# Patient Record
Sex: FEMALE | Race: OTHER | HISPANIC OR LATINO | URBAN - METROPOLITAN AREA
[De-identification: names, ages, dates, MRNs, and addresses within clinical notes are randomized per-mention and may not be internally consistent; named-entity substitution may affect disease eponyms.]

---

## 2021-07-30 ENCOUNTER — INPATIENT (INPATIENT)
Facility: HOSPITAL | Age: 76
LOS: 3 days | Discharge: HOME | DRG: 179 | End: 2021-08-03
Attending: INTERNAL MEDICINE | Admitting: INTERNAL MEDICINE
Payer: MEDICAID

## 2021-07-30 VITALS
WEIGHT: 139.99 LBS | DIASTOLIC BLOOD PRESSURE: 85 MMHG | RESPIRATION RATE: 20 BRPM | SYSTOLIC BLOOD PRESSURE: 159 MMHG | TEMPERATURE: 98 F | OXYGEN SATURATION: 97 % | HEIGHT: 65 IN | HEART RATE: 71 BPM

## 2021-07-30 DIAGNOSIS — R07.9 CHEST PAIN, UNSPECIFIED: ICD-10-CM

## 2021-07-30 LAB
ALBUMIN SERPL ELPH-MCNC: 4.1 G/DL — SIGNIFICANT CHANGE UP (ref 3.3–5)
ALP SERPL-CCNC: 111 U/L — SIGNIFICANT CHANGE UP (ref 40–120)
ALT FLD-CCNC: 19 U/L — SIGNIFICANT CHANGE UP (ref 10–45)
ANION GAP SERPL CALC-SCNC: 9 MMOL/L — SIGNIFICANT CHANGE UP (ref 5–17)
AST SERPL-CCNC: 21 U/L — SIGNIFICANT CHANGE UP (ref 10–40)
BASOPHILS # BLD AUTO: 0.03 K/UL — SIGNIFICANT CHANGE UP (ref 0–0.2)
BASOPHILS NFR BLD AUTO: 0.5 % — SIGNIFICANT CHANGE UP (ref 0–2)
BILIRUB SERPL-MCNC: 0.3 MG/DL — SIGNIFICANT CHANGE UP (ref 0.2–1.2)
BUN SERPL-MCNC: 16 MG/DL — SIGNIFICANT CHANGE UP (ref 7–23)
CALCIUM SERPL-MCNC: 9.5 MG/DL — SIGNIFICANT CHANGE UP (ref 8.4–10.5)
CHLORIDE SERPL-SCNC: 105 MMOL/L — SIGNIFICANT CHANGE UP (ref 96–108)
CO2 SERPL-SCNC: 25 MMOL/L — SIGNIFICANT CHANGE UP (ref 22–31)
CREAT SERPL-MCNC: 0.71 MG/DL — SIGNIFICANT CHANGE UP (ref 0.5–1.3)
D DIMER BLD IA.RAPID-MCNC: <150 NG/ML DDU — SIGNIFICANT CHANGE UP
EOSINOPHIL # BLD AUTO: 0.09 K/UL — SIGNIFICANT CHANGE UP (ref 0–0.5)
EOSINOPHIL NFR BLD AUTO: 1.5 % — SIGNIFICANT CHANGE UP (ref 0–6)
GLUCOSE SERPL-MCNC: 89 MG/DL — SIGNIFICANT CHANGE UP (ref 70–99)
HCT VFR BLD CALC: 41.2 % — SIGNIFICANT CHANGE UP (ref 34.5–45)
HGB BLD-MCNC: 13.4 G/DL — SIGNIFICANT CHANGE UP (ref 11.5–15.5)
IMM GRANULOCYTES NFR BLD AUTO: 0.2 % — SIGNIFICANT CHANGE UP (ref 0–1.5)
LYMPHOCYTES # BLD AUTO: 2.05 K/UL — SIGNIFICANT CHANGE UP (ref 1–3.3)
LYMPHOCYTES # BLD AUTO: 34.5 % — SIGNIFICANT CHANGE UP (ref 13–44)
MCHC RBC-ENTMCNC: 29.2 PG — SIGNIFICANT CHANGE UP (ref 27–34)
MCHC RBC-ENTMCNC: 32.5 GM/DL — SIGNIFICANT CHANGE UP (ref 32–36)
MCV RBC AUTO: 89.8 FL — SIGNIFICANT CHANGE UP (ref 80–100)
MONOCYTES # BLD AUTO: 0.63 K/UL — SIGNIFICANT CHANGE UP (ref 0–0.9)
MONOCYTES NFR BLD AUTO: 10.6 % — SIGNIFICANT CHANGE UP (ref 2–14)
NEUTROPHILS # BLD AUTO: 3.13 K/UL — SIGNIFICANT CHANGE UP (ref 1.8–7.4)
NEUTROPHILS NFR BLD AUTO: 52.7 % — SIGNIFICANT CHANGE UP (ref 43–77)
NRBC # BLD: 0 /100 WBCS — SIGNIFICANT CHANGE UP (ref 0–0)
PLATELET # BLD AUTO: 242 K/UL — SIGNIFICANT CHANGE UP (ref 150–400)
POTASSIUM SERPL-MCNC: 3.9 MMOL/L — SIGNIFICANT CHANGE UP (ref 3.5–5.3)
POTASSIUM SERPL-SCNC: 3.9 MMOL/L — SIGNIFICANT CHANGE UP (ref 3.5–5.3)
PROT SERPL-MCNC: 7.5 G/DL — SIGNIFICANT CHANGE UP (ref 6–8.3)
RBC # BLD: 4.59 M/UL — SIGNIFICANT CHANGE UP (ref 3.8–5.2)
RBC # FLD: 13.2 % — SIGNIFICANT CHANGE UP (ref 10.3–14.5)
SODIUM SERPL-SCNC: 139 MMOL/L — SIGNIFICANT CHANGE UP (ref 135–145)
TROPONIN T, HIGH SENSITIVITY RESULT: 6 NG/L — SIGNIFICANT CHANGE UP (ref 0–51)
TROPONIN T, HIGH SENSITIVITY RESULT: <6 NG/L — SIGNIFICANT CHANGE UP (ref 0–51)
WBC # BLD: 5.94 K/UL — SIGNIFICANT CHANGE UP (ref 3.8–10.5)
WBC # FLD AUTO: 5.94 K/UL — SIGNIFICANT CHANGE UP (ref 3.8–10.5)

## 2021-07-30 PROCEDURE — 93010 ELECTROCARDIOGRAM REPORT: CPT

## 2021-07-30 PROCEDURE — 71046 X-RAY EXAM CHEST 2 VIEWS: CPT | Mod: 26

## 2021-07-30 PROCEDURE — 99285 EMERGENCY DEPT VISIT HI MDM: CPT

## 2021-07-30 RX ORDER — SODIUM CHLORIDE 9 MG/ML
1000 INJECTION INTRAMUSCULAR; INTRAVENOUS; SUBCUTANEOUS ONCE
Refills: 0 | Status: COMPLETED | OUTPATIENT
Start: 2021-07-30 | End: 2021-07-30

## 2021-07-30 RX ORDER — ALBUTEROL 90 UG/1
1.25 AEROSOL, METERED ORAL
Refills: 0 | Status: DISCONTINUED | OUTPATIENT
Start: 2021-07-30 | End: 2021-08-03

## 2021-07-30 RX ORDER — BUDESONIDE, MICRONIZED 100 %
0.25 POWDER (GRAM) MISCELLANEOUS
Refills: 0 | Status: DISCONTINUED | OUTPATIENT
Start: 2021-07-30 | End: 2021-08-03

## 2021-07-30 RX ORDER — IBUPROFEN 200 MG
400 TABLET ORAL ONCE
Refills: 0 | Status: DISCONTINUED | OUTPATIENT
Start: 2021-07-30 | End: 2021-07-30

## 2021-07-30 RX ORDER — AMLODIPINE BESYLATE 2.5 MG/1
5 TABLET ORAL DAILY
Refills: 0 | Status: DISCONTINUED | OUTPATIENT
Start: 2021-07-30 | End: 2021-08-03

## 2021-07-30 RX ORDER — ATORVASTATIN CALCIUM 80 MG/1
40 TABLET, FILM COATED ORAL AT BEDTIME
Refills: 0 | Status: DISCONTINUED | OUTPATIENT
Start: 2021-07-30 | End: 2021-08-03

## 2021-07-30 RX ORDER — ENOXAPARIN SODIUM 100 MG/ML
40 INJECTION SUBCUTANEOUS DAILY
Refills: 0 | Status: DISCONTINUED | OUTPATIENT
Start: 2021-07-30 | End: 2021-08-03

## 2021-07-30 RX ADMIN — AMLODIPINE BESYLATE 5 MILLIGRAM(S): 2.5 TABLET ORAL at 21:54

## 2021-07-30 RX ADMIN — ATORVASTATIN CALCIUM 40 MILLIGRAM(S): 80 TABLET, FILM COATED ORAL at 21:44

## 2021-07-30 RX ADMIN — Medication 0.25 MILLIGRAM(S): at 23:10

## 2021-07-30 RX ADMIN — SODIUM CHLORIDE 1000 MILLILITER(S): 9 INJECTION INTRAMUSCULAR; INTRAVENOUS; SUBCUTANEOUS at 14:54

## 2021-07-30 RX ADMIN — ALBUTEROL 1.25 MILLIGRAM(S): 90 AEROSOL, METERED ORAL at 23:10

## 2021-07-30 NOTE — ED PROVIDER NOTE - NS ED ROS FT
Review of Systems:  -General: no fever   -ENT: no congestion, no difficulty swallowing  -Pulmonary: no cough, +shortness of breath  -Cardiac: +chest pain, no palpitations  -Gastrointestinal: no abdominal pain, no nausea, no vomiting, and no diarrhea.  -Genitourinary: no blood or pain with urination  -Musculoskeletal: no back or neck pain  -Skin: no rashes  -Endocrine: No h/o diabetes or thyroid disease  -Neurologic: No focal weakness or numbness    All else negative unless otherwise specified elsewhere in this note.

## 2021-07-30 NOTE — ED PROVIDER NOTE - PHYSICAL EXAMINATION
On Physical Exam:  General: well appearing, in NAD, speaking clearly in full sentences and without difficulty; cooperative with exam  HEENT: PERRL, MMM  Neck: no neck tenderness, no nuchal rigidity  Cardiac: normal s1, s2; RRR; no MGR  Lungs: CTABL  Abdomen: soft nontender/nondistended  : no bladder tenderness or distension  Skin: intact, no rash  Extremities: no peripheral edema, no gross deformities

## 2021-07-30 NOTE — ED PROVIDER NOTE - ATTENDING CONTRIBUTION TO CARE
Attending Statement (LEROY Bauer MD):   [id number]    HPI: 77y/o F h/o     Review of Systems:  -General: no fever or chills  -ENT: no congestion, no difficulty swallowing  -Pulmonary: no cough, no shortness of breath  -Cardiac: no chest pain, no palpitations  -Gastrointestinal: no abdominal pain, no nausea, no vomiting, and no diarrhea.  -Genitourinary: no blood or pain with urination  -Musculoskeletal: no back or neck pain  -Skin: no rashes  -Endocrine: No h/o diabetes or thyroid disease  -Neurologic: No focal weakness or numbness    All else negative unless otherwise specified elsewhere in this note.    PSH/PMH as noted above    On Physical Exam:  General: well appearing, in NAD, speaking clearly in full sentences and without difficulty; cooperative with exam  HEENT: PERRL, MMM  Neck: no neck tenderness, no nuchal rigidity  Cardiac: normal s1, s2; RRR; no MGR  Lungs: CTABL  Abdomen: soft nontender/nondistended  : no bladder tenderness or distension  Skin: intact, no rash  Extremities: no peripheral edema, no gross deformities  Neuro: no gross neurologic deficits    MDM: Attending Statement (LEROY Bauer MD):   [id number]    HPI: 75y/o F h/o   c/o pain in the "chest and heart" described as warmth and associated with shortness of breath; pain  described as pressure radiating to the left arm to the neck.  Started this morning; states she was laying down and just began out of no - where. States she awoke with pressure/pain and was sweating.  States symptoms lasted about 1 hour.  recently came [place] from [place] weeks ago. no h/o dvt/pe.    PMH:   meds:   allergies:     Review of Systems:  -General: no fever   -ENT: no congestion, no difficulty swallowing  -Pulmonary: no cough, +shortness of breath  -Cardiac: +chest pain, no palpitations  -Gastrointestinal: no abdominal pain, no nausea, no vomiting, and no diarrhea.  -Genitourinary: no blood or pain with urination  -Musculoskeletal: no back or neck pain  -Skin: no rashes  -Endocrine: No h/o diabetes or thyroid disease  -Neurologic: No focal weakness or numbness    All else negative unless otherwise specified elsewhere in this note.    PSH/PMH as noted above    On Physical Exam:  General: well appearing, in NAD, speaking clearly in full sentences and without difficulty; cooperative with exam  HEENT: PERRL, MMM  Neck: no neck tenderness, no nuchal rigidity  Cardiac: normal s1, s2; RRR; no MGR  Lungs: CTABL  Abdomen: soft nontender/nondistended  : no bladder tenderness or distension  Skin: intact, no rash  Extremities: no peripheral edema, no gross deformities  Neuro: no gross neurologic deficits    MDM: Attending Statement (LEROY Bauer MD):   [id number]    HPI: 77y/o F c/o pain in the "chest and heart" described as warmth and associated with shortness of breath; pain  described as pressure radiating to the left arm to the neck.  Started this morning; states she was laying down and just began out of no - where. States she awoke with pressure/pain and was sweating.  States symptoms lasted about 1 hour.  recently came to [place] from [place] few weeks ago. no h/o dvt/pe.  Denies cough/congestion, denies fever/chills, denies n/v/d, denies back pain, denies leg swelling/calf pain. Nonsmoker. No exogenous hormone use.    Review of Systems:  -General: no fever   -ENT: no congestion, no difficulty swallowing  -Pulmonary: no cough, +shortness of breath  -Cardiac: +chest pain, no palpitations  -Gastrointestinal: no abdominal pain, no nausea, no vomiting, and no diarrhea.  -Genitourinary: no blood or pain with urination  -Musculoskeletal: no back or neck pain  -Skin: no rashes  -Endocrine: No h/o diabetes or thyroid disease  -Neurologic: No focal weakness or numbness    All else negative unless otherwise specified elsewhere in this note.    PSH/PMH as noted above    On Physical Exam:  General: well appearing, in NAD, speaking clearly in full sentences and without difficulty; cooperative with exam  HEENT: PERRL, MMM  Neck: no neck tenderness, no nuchal rigidity  Cardiac: normal s1, s2; RRR; no MGR  Lungs: CTABL  Abdomen: soft nontender/nondistended  : no bladder tenderness or distension  Skin: intact, no rash  Extremities: no peripheral edema, no gross deformities    MDM: Pt presenting with chest pain, concerning for ACS - will give aspirin, and obtain ECG, CXR, and labs (CBC/CMP/Cardiac Enzymes). Patient's description of chest pain, including lack of pleuritic nature, minimal risk factors and overall clinical picture are not consistent with a pulmonary embolism; however, given recent travel, will obtain d dimer to further stratify risk (if low value, can exclude PE from differential). The patient's clinical presentation, including type/description of pain, stable vitals and overall clinical picture are not consistent with an acute aortic dissection.  No fever/chills, cough or other symptoms suggestive of an acute infectious etiology.    In ED, remains stable, but given age, description of symptoms; remain concerned for ACS/unstable angina; poor f/u; will plan for admission for further cardiac testing/evaluation.  D dimer neg, no significant anemia, troponin not significantly elevated.

## 2021-07-30 NOTE — H&P ADULT - ASSESSMENT
pt w/ chest pain  r/o acs  trops  echo  stress test  cards eval  pulm eval  pt takes pulm meds  dvt proph

## 2021-07-30 NOTE — H&P ADULT - HISTORY OF PRESENT ILLNESS
77y/o F c/o pain in thechest described as warmth and associated with shortness of breath; pain  described as pressure radiating to the left arm to the neck  .  Started this morning; states she was laying down    States she awoke with pressure/pain and was sweating.    States symptoms lasted about 1 hour.  recently came to [place] from [place] weeks ago. no h/o dvt/pe.    Denies cough/congestion, denies fever/chills, denies n/v/d, denies back pain, denies leg swelling/calf pain. Nonsmoker.

## 2021-07-30 NOTE — ED PROVIDER NOTE - OBJECTIVE STATEMENT
Attending Statement (LEROY Bauer MD):   [id number]    HPI: 75y/o F c/o pain in the "chest and heart" described as warmth and associated with shortness of breath; pain  described as pressure radiating to the left arm to the neck.  Started this morning; states she was laying down and just began out of no - where. States she awoke with pressure/pain and was sweating.  States symptoms lasted about 1 hour.  recently came [place] from [place] weeks ago. no h/o dvt/pe.  Denies cough/congestion, denies fever/chills, denies n/v/d, denies back pain, denies leg swelling/calf pain. Nonsmoker. No exogenous hormone use.

## 2021-07-30 NOTE — ED ADULT TRIAGE NOTE - NS_BHTRGSOURCE_ED_A_ED_FT
Care Due:                  Date            Visit Type   Department     Provider  --------------------------------------------------------------------------------                                CAROLINAT                              VIRTUAL      [facility]  Last Visit: 04-      VISIT        ROSA BURGOS                              ANNUAL                              CHECKUP/PHY  [facility]  Next Visit: 11-      S            ROSA SEVERINO                                                            Last  Test          Frequency    Reason                     Performed    Due Date  --------------------------------------------------------------------------------    HDL.........  12 months..  atorvastatin.............  06- 06-    LDL.........  12 months..  atorvastatin.............  06- 06-    Total         12 months..  atorvastatin.............  06- 06-  Cholesterol.    Triglyceride  12 months..  atorvastatin.............  06- 06-  s...........    Powered by [employer]. Reference number: [id number]. 10/23/2020 6:10:54 PM   CDT   [place]

## 2021-07-30 NOTE — H&P ADULT - NSHPLABSRESULTS_GEN_ALL_CORE
13.4   5.94  )-----------( 242      ( 30 Jul 2021 14:24 )             41.2       07-30    139  |  105  |  16  ----------------------------<  89  3.9   |  25  |  0.71    Ca    9.5      30 Jul 2021 14:24    TPro  7.5  /  Alb  4.1  /  TBili  0.3  /  DBili  x   /  AST  21  /  ALT  19  /  AlkPhos  111  07-30                      Lactate Trend            CAPILLARY BLOOD GLUCOSE

## 2021-07-31 DIAGNOSIS — R07.9 CHEST PAIN, UNSPECIFIED: ICD-10-CM

## 2021-07-31 DIAGNOSIS — R06.02 SHORTNESS OF BREATH: ICD-10-CM

## 2021-07-31 LAB
ANION GAP SERPL CALC-SCNC: 12 MMOL/L — SIGNIFICANT CHANGE UP (ref 5–17)
BUN SERPL-MCNC: 12 MG/DL — SIGNIFICANT CHANGE UP (ref 7–23)
CALCIUM SERPL-MCNC: 9.3 MG/DL — SIGNIFICANT CHANGE UP (ref 8.4–10.5)
CHLORIDE SERPL-SCNC: 103 MMOL/L — SIGNIFICANT CHANGE UP (ref 96–108)
CO2 SERPL-SCNC: 23 MMOL/L — SIGNIFICANT CHANGE UP (ref 22–31)
COVID-19 SPIKE DOMAIN AB INTERP: POSITIVE
COVID-19 SPIKE DOMAIN ANTIBODY RESULT: >250 U/ML — HIGH
CREAT SERPL-MCNC: 0.68 MG/DL — SIGNIFICANT CHANGE UP (ref 0.5–1.3)
GLUCOSE SERPL-MCNC: 72 MG/DL — SIGNIFICANT CHANGE UP (ref 70–99)
HCT VFR BLD CALC: 39.6 % — SIGNIFICANT CHANGE UP (ref 34.5–45)
HCV AB S/CO SERPL IA: 0.14 S/CO — SIGNIFICANT CHANGE UP (ref 0–0.99)
HCV AB SERPL-IMP: SIGNIFICANT CHANGE UP
HGB BLD-MCNC: 12.9 G/DL — SIGNIFICANT CHANGE UP (ref 11.5–15.5)
MCHC RBC-ENTMCNC: 29.4 PG — SIGNIFICANT CHANGE UP (ref 27–34)
MCHC RBC-ENTMCNC: 32.6 GM/DL — SIGNIFICANT CHANGE UP (ref 32–36)
MCV RBC AUTO: 90.2 FL — SIGNIFICANT CHANGE UP (ref 80–100)
NRBC # BLD: 0 /100 WBCS — SIGNIFICANT CHANGE UP (ref 0–0)
PLATELET # BLD AUTO: 230 K/UL — SIGNIFICANT CHANGE UP (ref 150–400)
POTASSIUM SERPL-MCNC: 3.6 MMOL/L — SIGNIFICANT CHANGE UP (ref 3.5–5.3)
POTASSIUM SERPL-SCNC: 3.6 MMOL/L — SIGNIFICANT CHANGE UP (ref 3.5–5.3)
RBC # BLD: 4.39 M/UL — SIGNIFICANT CHANGE UP (ref 3.8–5.2)
RBC # FLD: 13.3 % — SIGNIFICANT CHANGE UP (ref 10.3–14.5)
SARS-COV-2 IGG+IGM SERPL QL IA: >250 U/ML — HIGH
SARS-COV-2 IGG+IGM SERPL QL IA: POSITIVE
SARS-COV-2 RNA SPEC QL NAA+PROBE: SIGNIFICANT CHANGE UP
SODIUM SERPL-SCNC: 138 MMOL/L — SIGNIFICANT CHANGE UP (ref 135–145)
TROPONIN T, HIGH SENSITIVITY RESULT: 6 NG/L — SIGNIFICANT CHANGE UP (ref 0–51)
WBC # BLD: 7.44 K/UL — SIGNIFICANT CHANGE UP (ref 3.8–10.5)
WBC # FLD AUTO: 7.44 K/UL — SIGNIFICANT CHANGE UP (ref 3.8–10.5)

## 2021-07-31 PROCEDURE — 99252 IP/OBS CONSLTJ NEW/EST SF 35: CPT | Mod: GC

## 2021-07-31 RX ORDER — ACETAMINOPHEN 500 MG
650 TABLET ORAL ONCE
Refills: 0 | Status: COMPLETED | OUTPATIENT
Start: 2021-07-31 | End: 2021-07-31

## 2021-07-31 RX ADMIN — ENOXAPARIN SODIUM 40 MILLIGRAM(S): 100 INJECTION SUBCUTANEOUS at 14:50

## 2021-07-31 RX ADMIN — AMLODIPINE BESYLATE 5 MILLIGRAM(S): 2.5 TABLET ORAL at 05:41

## 2021-07-31 RX ADMIN — ATORVASTATIN CALCIUM 40 MILLIGRAM(S): 80 TABLET, FILM COATED ORAL at 22:26

## 2021-07-31 RX ADMIN — ALBUTEROL 1.25 MILLIGRAM(S): 90 AEROSOL, METERED ORAL at 05:47

## 2021-07-31 RX ADMIN — ALBUTEROL 1.25 MILLIGRAM(S): 90 AEROSOL, METERED ORAL at 18:44

## 2021-07-31 RX ADMIN — Medication 0.25 MILLIGRAM(S): at 18:45

## 2021-07-31 RX ADMIN — ALBUTEROL 1.25 MILLIGRAM(S): 90 AEROSOL, METERED ORAL at 23:04

## 2021-07-31 RX ADMIN — Medication 650 MILLIGRAM(S): at 23:20

## 2021-07-31 RX ADMIN — Medication 0.25 MILLIGRAM(S): at 05:47

## 2021-07-31 RX ADMIN — ALBUTEROL 1.25 MILLIGRAM(S): 90 AEROSOL, METERED ORAL at 14:51

## 2021-07-31 NOTE — PROGRESS NOTE ADULT - SUBJECTIVE AND OBJECTIVE BOX
DATE OF SERVICE: 07-31-21 @ 10:38  CHIEF COMPLAINT:Patient is a 76y old  Female who presents with a chief complaint of   	        PAST MEDICAL & SURGICAL HISTORY:  HTN, age 0-18            REVIEW OF SYSTEMS:  no new changes  no cp now      Medications:  MEDICATIONS  (STANDING):  ALBUTerol   0.042% 1.25 milliGRAM(s) Nebulizer four times a day  amLODIPine   Tablet 5 milliGRAM(s) Oral daily  atorvastatin 40 milliGRAM(s) Oral at bedtime  buDESOnide    Inhalation Suspension 0.25 milliGRAM(s) Inhalation two times a day  enoxaparin Injectable 40 milliGRAM(s) SubCutaneous daily    MEDICATIONS  (PRN):    	    PHYSICAL EXAM:  T(C): 36.8 (07-31-21 @ 05:13), Max: 37.3 (07-30-21 @ 20:02)  HR: 69 (07-31-21 @ 05:13) (63 - 71)  BP: 122/76 (07-31-21 @ 05:13) (107/72 - 159/85)  RR: 18 (07-31-21 @ 05:13) (16 - 20)  SpO2: 96% (07-31-21 @ 05:13) (96% - 100%)  Wt(kg): --  I&O's Summary      Appearance: Normal	  	    Cardiovascular: Normal S1 S2, No JVD, No murmurs, No edema  Respiratory: Lungs clear to auscultation	    Gastrointestinal:  Soft, Non-tender, + BS	  	  Neurologic: Non-focal  Extremities: Normal range of motion, No clubbing, cyanosis or edema      TELEMETRY: 	    ECG:  	  RADIOLOGY:  OTHER: 	  	  LABS:	 	    CARDIAC MARKERS:                                12.9   7.44  )-----------( 230      ( 31 Jul 2021 06:56 )             39.6     07-31    138  |  103  |  12  ----------------------------<  72  3.6   |  23  |  0.68    Ca    9.3      31 Jul 2021 06:56    TPro  7.5  /  Alb  4.1  /  TBili  0.3  /  DBili  x   /  AST  21  /  ALT  19  /  AlkPhos  111  07-30    proBNP:   Lipid Profile:   HgA1c:   TSH:

## 2021-07-31 NOTE — PROVIDER CONTACT NOTE (OTHER) - ASSESSMENT
Pt A&Ox4 and states she has SOB and midsternal chest pain along with pressure on her chest. Vitals 98.0 F, Sat O2 99%, HR 66, RR 18, and /76.

## 2021-07-31 NOTE — CONSULT NOTE ADULT - ATTENDING COMMENTS
77 y/o woman with HTN from [facility] here w/ atypical chest pain x 1 day.  --Atypical chest pain, negative troponin so far, and unrevealing electrocardiogram.  --Nevertheless, patient with cardiac risk factors with no reported prior cardiac work-up.  --Check Nuclear stress and echocardiogram.  --check lipid panel and TSH.  --Will follow.

## 2021-07-31 NOTE — PROGRESS NOTE ADULT - SUBJECTIVE AND OBJECTIVE BOX
PULMONARY CONSULT    HPI: 75 y/o with PMH of HTN who presents with chest pain and associated SOB and diaphoresis x1 day. Reportedly on admission pain was described as warmth & pressure with pain radiating down L arm to the neck. Symptoms noted to last 1 hour. Patient recently traveled to  from [place] a few weeks ago. Troponins negative. Pulmonary called to consult on SOB.     PAST MEDICAL & SURGICAL HISTORY:  HTN, age [age]      Allergies  penicillin (Rash)    FAMILY HISTORY:    Social history:     Review of Systems:  CONSTITUTIONAL: No fever, chills, or fatigue  EYES: No eye pain, visual disturbances, or discharge  ENMT:  No difficulty hearing, tinnitus, vertigo; No sinus or throat pain  NECK: No pain or stiffness  RESPIRATORY: Per above  CARDIOVASCULAR: No chest pain, palpitations, dizziness, or leg swelling  GASTROINTESTINAL: No abdominal or epigastric pain. No nausea, vomiting, or hematemesis; No diarrhea or constipation. No melena or hematochezia.  GENITOURINARY: No dysuria, frequency, hematuria, or incontinence  NEUROLOGICAL: No headaches, memory loss, loss of strength, numbness, or tremors  SKIN: No itching, burning, rashes, or lesions   MUSCULOSKELETAL: No joint pain or swelling; No muscle, back, or extremity pain  PSYCHIATRIC: No depression, anxiety, mood swings, or difficulty sleeping    Medications:  MEDICATIONS  (STANDING):  ALBUTerol   0.042% 1.25 milliGRAM(s) Nebulizer four times a day  amLODIPine   Tablet 5 milliGRAM(s) Oral daily  atorvastatin 40 milliGRAM(s) Oral at bedtime  buDESOnide    Inhalation Suspension 0.25 milliGRAM(s) Inhalation two times a day  enoxaparin Injectable 40 milliGRAM(s) SubCutaneous daily      Vital Signs Last 24 Hrs  T(C): 36.8 (31 Jul 2021 05:13), Max: 37.3 (30 Jul 2021 20:02)  T(F): 98.3 (31 Jul 2021 05:13), Max: 99.1 (30 Jul 2021 20:02)  HR: 69 (31 Jul 2021 05:13) (63 - 71)  BP: 122/76 (31 Jul 2021 05:13) (107/72 - 139/60)  BP(mean): --  RR: 18 (31 Jul 2021 05:13) (16 - 20)  SpO2: 96% (31 Jul 2021 05:13) (96% - 100%)      LABS:                        12.9   7.44  )-----------( 230      ( 31 Jul 2021 06:56 )             39.6     07-31    138  |  103  |  12  ----------------------------<  72  3.6   |  23  |  0.68    Ca    9.3      31 Jul 2021 06:56    TPro  7.5  /  Alb  4.1  /  TBili  0.3  /  DBili  x   /  AST  21  /  ALT  19  /  AlkPhos  111  07-30    Physical Examination:    General: No acute distress.      HEENT: Pupils equal, reactive to light.  Symmetric.    PULM:     CVS: S1, S2    ABD: Soft, nondistended, nontender, normoactive bowel sounds, no masses    EXT: No edema, nontender    SKIN: Warm and well perfused, no rashes noted.    NEURO: Alert, oriented, interactive, nonfocal    RADIOLOGY REVIEWED  CXR:     PULMONARY CONSULT    HPI: 77 y/o with PMH of HTN, questionable lung disease, who presents with chest pain and associated SOB and diaphoresis x1 day. Reportedly on admission pain was described as warmth & pressure with pain radiating down L arm to the neck. Symptoms noted to last 1 hour. Patient recently traveled to  from [place] a few weeks ago. Troponins negative. Pulmonary called to consult on SOB. CXR with clear lungs. Patient denies smoking hx but notes exposure to secondhand smoke. Inhaler use at home noted in outpt record. At this time, denies CP, SOB. Breathing comfortably on room air, O2 sats 96%.     PAST MEDICAL & SURGICAL HISTORY:  HTN, age 0-18    Allergies  penicillin (Rash)    FAMILY HISTORY: non contributory     Social history: exposure to secondhand smoke     Review of Systems:  CONSTITUTIONAL: No fever, chills, or fatigue  EYES: No eye pain, visual disturbances, or discharge  ENMT:  No difficulty hearing, tinnitus, vertigo; No sinus or throat pain  NECK: No pain or stiffness  RESPIRATORY: Per above  CARDIOVASCULAR: No chest pain, palpitations, dizziness, or leg swelling  GASTROINTESTINAL: No abdominal or epigastric pain. No nausea, vomiting, or hematemesis; No diarrhea or constipation. No melena or hematochezia.  GENITOURINARY: No dysuria, frequency, hematuria, or incontinence  NEUROLOGICAL: No headaches, memory loss, loss of strength, numbness, or tremors  SKIN: No itching, burning, rashes, or lesions   MUSCULOSKELETAL: No joint pain or swelling; No muscle, back, or extremity pain  PSYCHIATRIC: No depression, anxiety, mood swings, or difficulty sleeping    Medications:  MEDICATIONS  (STANDING):  ALBUTerol   0.042% 1.25 milliGRAM(s) Nebulizer four times a day  amLODIPine   Tablet 5 milliGRAM(s) Oral daily  atorvastatin 40 milliGRAM(s) Oral at bedtime  buDESOnide    Inhalation Suspension 0.25 milliGRAM(s) Inhalation two times a day  enoxaparin Injectable 40 milliGRAM(s) SubCutaneous daily    Vital Signs Last 24 Hrs  T(C): 36.8 (31 Jul 2021 05:13), Max: 37.3 (30 Jul 2021 20:02)  T(F): 98.3 (31 Jul 2021 05:13), Max: 99.1 (30 Jul 2021 20:02)  HR: 69 (31 Jul 2021 05:13) (63 - 71)  BP: 122/76 (31 Jul 2021 05:13) (107/72 - 139/60)  BP(mean): --  RR: 18 (31 Jul 2021 05:13) (16 - 20)  SpO2: 96% (31 Jul 2021 05:13) (96% - 100%)    LABS:                        12.9   7.44  )-----------( 230      ( 31 Jul 2021 06:56 )             39.6     07-31    138  |  103  |  12  ----------------------------<  72  3.6   |  23  |  0.68    Ca    9.3      31 Jul 2021 06:56    TPro  7.5  /  Alb  4.1  /  TBili  0.3  /  DBili  x   /  AST  21  /  ALT  19  /  AlkPhos  111  07-30    Physical Examination:    General: No acute distress.      HEENT: Pupils equal, reactive to light.  Symmetric.    PULM: CTA b/l, no wheezing    CVS: RRR    ABD: Soft, nondistended, nontender, normoactive bowel sounds, no masses    EXT: No edema, nontender    SKIN: Warm and well perfused, no rashes noted.    NEURO: Alert, oriented, interactive, nonfocal    RADIOLOGY REVIEWED  CXR: 7/30 grossly clear lungs

## 2021-07-31 NOTE — PROVIDER CONTACT NOTE (OTHER) - ACTION/TREATMENT ORDERED:
Provider notified and aware, Administer Albuterol 0.042% and Pulmicort Nebulizer treatment as ordered, continue to assess change in pt status

## 2021-07-31 NOTE — CONSULT NOTE ADULT - ASSESSMENT
76F w/ HTN from [place] here w/ atypical chest pain x 1 day. Cardiology called for ischemic eval. Low level of suspicion for ACS given negative trops, lack of symptoms now and lack of ECG changes.    Recs:  - agree with echo and stress testing  - will con't to follow    Uriel Salinas MD  Cardiology Fellow PGY-5  [facility]    Notes are not final until signed by attending  For all consults and questions:  [email or web address]   Login: [email or web address]

## 2021-08-01 LAB
APPEARANCE UR: CLEAR — SIGNIFICANT CHANGE UP
BILIRUB UR-MCNC: NEGATIVE — SIGNIFICANT CHANGE UP
COLOR SPEC: COLORLESS — SIGNIFICANT CHANGE UP
DIFF PNL FLD: ABNORMAL
GLUCOSE UR QL: NEGATIVE — SIGNIFICANT CHANGE UP
HCT VFR BLD CALC: 41.5 % — SIGNIFICANT CHANGE UP (ref 34.5–45)
HGB BLD-MCNC: 13.8 G/DL — SIGNIFICANT CHANGE UP (ref 11.5–15.5)
KETONES UR-MCNC: NEGATIVE — SIGNIFICANT CHANGE UP
LEUKOCYTE ESTERASE UR-ACNC: NEGATIVE — SIGNIFICANT CHANGE UP
MCHC RBC-ENTMCNC: 29.6 PG — SIGNIFICANT CHANGE UP (ref 27–34)
MCHC RBC-ENTMCNC: 33.3 GM/DL — SIGNIFICANT CHANGE UP (ref 32–36)
MCV RBC AUTO: 88.9 FL — SIGNIFICANT CHANGE UP (ref 80–100)
NITRITE UR-MCNC: NEGATIVE — SIGNIFICANT CHANGE UP
NRBC # BLD: 0 /100 WBCS — SIGNIFICANT CHANGE UP (ref 0–0)
PH UR: 6 — SIGNIFICANT CHANGE UP (ref 5–8)
PLATELET # BLD AUTO: 214 K/UL — SIGNIFICANT CHANGE UP (ref 150–400)
PROT UR-MCNC: NEGATIVE — SIGNIFICANT CHANGE UP
RBC # BLD: 4.67 M/UL — SIGNIFICANT CHANGE UP (ref 3.8–5.2)
RBC # FLD: 13.3 % — SIGNIFICANT CHANGE UP (ref 10.3–14.5)
SP GR SPEC: 1.01 — LOW (ref 1.01–1.02)
UROBILINOGEN FLD QL: NEGATIVE — SIGNIFICANT CHANGE UP
WBC # BLD: 9.69 K/UL — SIGNIFICANT CHANGE UP (ref 3.8–10.5)
WBC # FLD AUTO: 9.69 K/UL — SIGNIFICANT CHANGE UP (ref 3.8–10.5)

## 2021-08-01 PROCEDURE — 99232 SBSQ HOSP IP/OBS MODERATE 35: CPT | Mod: GC

## 2021-08-01 PROCEDURE — 93306 TTE W/DOPPLER COMPLETE: CPT | Mod: 26

## 2021-08-01 PROCEDURE — 70450 CT HEAD/BRAIN W/O DYE: CPT | Mod: 26

## 2021-08-01 RX ORDER — ACETAMINOPHEN 500 MG
650 TABLET ORAL EVERY 6 HOURS
Refills: 0 | Status: DISCONTINUED | OUTPATIENT
Start: 2021-08-01 | End: 2021-08-03

## 2021-08-01 RX ORDER — PANTOPRAZOLE SODIUM 20 MG/1
40 TABLET, DELAYED RELEASE ORAL
Refills: 0 | Status: DISCONTINUED | OUTPATIENT
Start: 2021-08-01 | End: 2021-08-03

## 2021-08-01 RX ADMIN — Medication 650 MILLIGRAM(S): at 11:15

## 2021-08-01 RX ADMIN — ALBUTEROL 1.25 MILLIGRAM(S): 90 AEROSOL, METERED ORAL at 23:00

## 2021-08-01 RX ADMIN — Medication 650 MILLIGRAM(S): at 10:30

## 2021-08-01 RX ADMIN — ALBUTEROL 1.25 MILLIGRAM(S): 90 AEROSOL, METERED ORAL at 18:56

## 2021-08-01 RX ADMIN — ALBUTEROL 1.25 MILLIGRAM(S): 90 AEROSOL, METERED ORAL at 11:46

## 2021-08-01 RX ADMIN — PANTOPRAZOLE SODIUM 40 MILLIGRAM(S): 20 TABLET, DELAYED RELEASE ORAL at 10:30

## 2021-08-01 RX ADMIN — Medication 650 MILLIGRAM(S): at 18:55

## 2021-08-01 RX ADMIN — Medication 0.25 MILLIGRAM(S): at 18:56

## 2021-08-01 RX ADMIN — Medication 0.25 MILLIGRAM(S): at 06:09

## 2021-08-01 RX ADMIN — ENOXAPARIN SODIUM 40 MILLIGRAM(S): 100 INJECTION SUBCUTANEOUS at 11:46

## 2021-08-01 RX ADMIN — ATORVASTATIN CALCIUM 40 MILLIGRAM(S): 80 TABLET, FILM COATED ORAL at 21:57

## 2021-08-01 RX ADMIN — AMLODIPINE BESYLATE 5 MILLIGRAM(S): 2.5 TABLET ORAL at 06:09

## 2021-08-01 RX ADMIN — Medication 650 MILLIGRAM(S): at 00:02

## 2021-08-01 RX ADMIN — ALBUTEROL 1.25 MILLIGRAM(S): 90 AEROSOL, METERED ORAL at 06:10

## 2021-08-01 RX ADMIN — Medication 200 MILLIGRAM(S): at 18:56

## 2021-08-01 RX ADMIN — Medication 200 MILLIGRAM(S): at 10:30

## 2021-08-01 NOTE — PROGRESS NOTE ADULT - SUBJECTIVE AND OBJECTIVE BOX
Patient seen and examined at bedside.    Overnight Events:   denies cp or sob   for NST/TTE today    REVIEW OF SYSTEMS:  Constitutional:     [x ] negative [ ] fevers [ ] chills [ ] weight loss [ ] weight gain  HEENT:                  [x ] negative [ ] dry eyes [ ] eye irritation [ ] postnasal drip [ ] nasal congestion  CV:                         [ x] negative  [ ] chest pain [ ] orthopnea [ ] palpitations [ ] murmur  Resp:                     [ x] negative [ ] cough [ ] shortness of breath [ ] dyspnea [ ] wheezing [ ] sputum [ ]hemoptysis  GI:                          [ x] negative [ ] nausea [ ] vomiting [ ] diarrhea [ ] constipation [ ] abd pain [ ] dysphagia   :                        [ x] negative [ ] dysuria [ ] nocturia [ ] hematuria [ ] increased urinary frequency  Musculoskeletal: [ x] negative [ ] back pain [ ] myalgias [ ] arthralgias [ ] fracture  Skin:                       [ x] negative [ ] rash [ ] itch  Neurological:        [x ] negative [ ] headache [ ] dizziness [ ] syncope [ ] weakness [ ] numbness  Psychiatric:           [ x] negative [ ] anxiety [ ] depression  Endocrine:            [ x] negative [ ] diabetes [ ] thyroid problem  Heme/Lymph:      [ x] negative [ ] anemia [ ] bleeding problem  Allergic/Immune: [ x] negative [ ] itchy eyes [ ] nasal discharge [ ] hives [ ] angioedema    [ x] All other systems negative  [ ] Unable to assess ROS due to    Current Meds:  ALBUTerol   0.042% 1.25 milliGRAM(s) Nebulizer four times a day  amLODIPine   Tablet 5 milliGRAM(s) Oral daily  atorvastatin 40 milliGRAM(s) Oral at bedtime  buDESOnide    Inhalation Suspension 0.25 milliGRAM(s) Inhalation two times a day  enoxaparin Injectable 40 milliGRAM(s) SubCutaneous daily      PAST MEDICAL & SURGICAL HISTORY:  HTN, age 0-18        Vitals:  T(F): 98.1 (08-01), Max: 98.1 (08-01)  HR: 70 (08-01) (70 - 84)  BP: 108/65 (08-01) (108/65 - 118/76)  RR: 18 (08-01)  SpO2: 95% (08-01)  I&O's Summary      Physical Exam:  Appearance: No acute distress  HENT: No JVD   Cardiovascular: RRR, S1/S2, no murmurs  Respiratory: CTABL  Gastrointestinal: soft, NT ND, +BS  Musculoskeletal: No clubbing, no edema   Neurologic: Non-focal  Skin: No rashes, ecchymoses, or cyanosis                          12.9   7.44  )-----------( 230      ( 31 Jul 2021 06:56 )             39.6     07-31    138  |  103  |  12  ----------------------------<  72  3.6   |  23  |  0.68    Ca    9.3      31 Jul 2021 06:56    TPro  7.5  /  Alb  4.1  /  TBili  0.3  /  DBili  x   /  AST  21  /  ALT  19  /  AlkPhos  111  07-30                  Cardiovascular Testings:       Interpretation of Telemetry: SR 60s

## 2021-08-01 NOTE — CHART NOTE - NSCHARTNOTEFT_GEN_A_CORE
77y/o F c/o pain in the chest described as warmth and associated with shortness of breath; pain described as pressure radiating to the left arm to the neck. Called by RN for patient with temp of 100.2 orally, patient seen and examined, states HA x 48hrs w/ photophobia, Urinary frequency/feeling of not complete emptying of bladder and cough. Denies blurred vision, chest pain, abdominal pain, nausea and/or vomiting.     Vital Signs Last 24 Hrs  T(C): 37.9 (01 Aug 2021 18:23), Max: 37.9 (01 Aug 2021 18:23)  T(F): 100.2 (01 Aug 2021 18:23), Max: 100.2 (01 Aug 2021 18:23)  HR: 87 (01 Aug 2021 18:23) (70 - 87)  BP: 136/81 (01 Aug 2021 18:23) (108/65 - 136/81)  RR: 18 (01 Aug 2021 18:23) (18 - 18)  SpO2: 98% (01 Aug 2021 18:23) (95% - 98%)                          12.9   7.44  )-----------( 230      ( 31 Jul 2021 06:56 )             39.6   07-31    138  |  103  |  12  ----------------------------<  72  3.6   |  23  |  0.68    Ca    9.3      31 Jul 2021 06:56    HEENT:   Normal oral mucosa, PERRL, EOMI	  Lymphatic: No lymphadenopathy  Cardiovascular: Normal S1 S2, No JVD, No murmurs, No edema  Respiratory: Lungs clear to auscultation	  Psychiatry: A & O x 3, Mood & affect appropriate  Gastrointestinal:  Soft, Non-tender, + BS	  Skin: No rashes, No ecchymoses, No cyanosis	  Neurologic: Non-focal  Extremities: Normal range of motion, No clubbing, cyanosis or edema  Vascular: Peripheral pulses palpable 2+     A/P 77y/o F c/o pain in the chest described as warmth and associated with shortness of breath; pain described as pressure radiating to the left arm to the neck. Called by RN for patient with temp of 100.2 orally, patient seen and examined, states HA x 48hrs w/ photophobia, Urinary frequency/feeling of not complete emptying of bladder and cough. Will r/o respiratory vs  etiology of fever.   - Stat UA   - RVP   - CTH for HA  - CBC/BC  - Bladder scan r/u retention   - Signed out to night team to f/u results, attempted to page Dr. Adams awaiting call back, Night team to continue care.

## 2021-08-01 NOTE — PROGRESS NOTE ADULT - SUBJECTIVE AND OBJECTIVE BOX
DATE OF SERVICE: 08-01-21 @ 13:02  CHIEF COMPLAINT:Patient is a 76y old  Female who presents with a chief complaint of cp (01 Aug 2021 07:39)    	        PAST MEDICAL & SURGICAL HISTORY:  HTN, age 0-18            REVIEW OF SYSTEMS:      NECK: No pain or stiffness  RESPIRATORY: No cough, wheezing, chills or hemoptysis; No Shortness of Breath  CARDIOVASCULAR: No chest pain, this am   GASTROINTESTINAL: No abdominal or epigastric pain.   GENITOURINARY: No dysuria, frequency, hematuria, or incontinence  NEUROLOGICAL: No headaches,    Medications:  MEDICATIONS  (STANDING):  ALBUTerol   0.042% 1.25 milliGRAM(s) Nebulizer four times a day  amLODIPine   Tablet 5 milliGRAM(s) Oral daily  atorvastatin 40 milliGRAM(s) Oral at bedtime  buDESOnide    Inhalation Suspension 0.25 milliGRAM(s) Inhalation two times a day  enoxaparin Injectable 40 milliGRAM(s) SubCutaneous daily  pantoprazole    Tablet 40 milliGRAM(s) Oral before breakfast    MEDICATIONS  (PRN):  acetaminophen   Tablet .. 650 milliGRAM(s) Oral every 6 hours PRN Mild Pain (1 - 3)  aluminum hydroxide/magnesium hydroxide/simethicone Suspension 30 milliLiter(s) Oral every 4 hours PRN Dyspepsia  guaiFENesin Oral Liquid (Sugar-Free) 200 milliGRAM(s) Oral every 6 hours PRN Cough    	    PHYSICAL EXAM:  T(C): 36.7 (08-01-21 @ 04:52), Max: 36.7 (08-01-21 @ 04:52)  HR: 70 (08-01-21 @ 04:52) (70 - 84)  BP: 108/65 (08-01-21 @ 04:52) (108/65 - 118/76)  RR: 18 (08-01-21 @ 04:52) (18 - 18)  SpO2: 95% (08-01-21 @ 04:52) (95% - 97%)  Wt(kg): --  I&O's Summary      Appearance: Normal	  HEENT:   Normal oral mucosa, PERRL, EOMI	  Lymphatic: No lymphadenopathy  Cardiovascular: Normal S1 S2, No JVD, No murmurs, No edema  Respiratory: Lungs clear to auscultation	  Psychiatry: A & O x 3, Mood & affect appropriate  Gastrointestinal:  Soft, Non-tender, + BS	  Skin: No rashes, No ecchymoses, No cyanosis	  Neurologic: Non-focal  Extremities: Normal range of motion, No clubbing, cyanosis or edema  Vascular: Peripheral pulses palpable 2+ bilaterally    TELEMETRY: 	    ECG:  	  RADIOLOGY:  OTHER: 	  	  LABS:	 	    CARDIAC MARKERS:                                12.9   7.44  )-----------( 230      ( 31 Jul 2021 06:56 )             39.6     07-31    138  |  103  |  12  ----------------------------<  72  3.6   |  23  |  0.68    Ca    9.3      31 Jul 2021 06:56    TPro  7.5  /  Alb  4.1  /  TBili  0.3  /  DBili  x   /  AST  21  /  ALT  19  /  AlkPhos  111  07-30    proBNP:   Lipid Profile:   HgA1c:   TSH:

## 2021-08-02 DIAGNOSIS — U07.1 COVID-19: ICD-10-CM

## 2021-08-02 LAB
ALBUMIN SERPL ELPH-MCNC: 4 G/DL — SIGNIFICANT CHANGE UP (ref 3.3–5)
ALP SERPL-CCNC: 130 U/L — HIGH (ref 40–120)
ALT FLD-CCNC: 41 U/L — SIGNIFICANT CHANGE UP (ref 10–45)
AST SERPL-CCNC: 45 U/L — HIGH (ref 10–40)
BILIRUB DIRECT SERPL-MCNC: <0.1 MG/DL — SIGNIFICANT CHANGE UP (ref 0–0.2)
BILIRUB INDIRECT FLD-MCNC: >0.3 MG/DL — SIGNIFICANT CHANGE UP (ref 0.2–1)
BILIRUB SERPL-MCNC: 0.4 MG/DL — SIGNIFICANT CHANGE UP (ref 0.2–1.2)
CREAT SERPL-MCNC: 0.78 MG/DL — SIGNIFICANT CHANGE UP (ref 0.5–1.3)
INR BLD: 1.12 RATIO — SIGNIFICANT CHANGE UP (ref 0.88–1.16)
PROT SERPL-MCNC: 7.6 G/DL — SIGNIFICANT CHANGE UP (ref 6–8.3)
PROTHROM AB SERPL-ACNC: 13.4 SEC — SIGNIFICANT CHANGE UP (ref 10.6–13.6)
RAPID RVP RESULT: DETECTED
RAPID RVP RESULT: DETECTED
SARS-COV-2 RNA SPEC QL NAA+PROBE: DETECTED
SARS-COV-2 RNA SPEC QL NAA+PROBE: DETECTED

## 2021-08-02 PROCEDURE — 93970 EXTREMITY STUDY: CPT | Mod: 26

## 2021-08-02 PROCEDURE — 99222 1ST HOSP IP/OBS MODERATE 55: CPT

## 2021-08-02 RX ORDER — REMDESIVIR 5 MG/ML
100 INJECTION INTRAVENOUS EVERY 24 HOURS
Refills: 0 | Status: DISCONTINUED | OUTPATIENT
Start: 2021-08-03 | End: 2021-08-03

## 2021-08-02 RX ORDER — REMDESIVIR 5 MG/ML
200 INJECTION INTRAVENOUS EVERY 24 HOURS
Refills: 0 | Status: COMPLETED | OUTPATIENT
Start: 2021-08-02 | End: 2021-08-02

## 2021-08-02 RX ORDER — REMDESIVIR 5 MG/ML
INJECTION INTRAVENOUS
Refills: 0 | Status: DISCONTINUED | OUTPATIENT
Start: 2021-08-02 | End: 2021-08-03

## 2021-08-02 RX ADMIN — REMDESIVIR 500 MILLIGRAM(S): 5 INJECTION INTRAVENOUS at 09:40

## 2021-08-02 RX ADMIN — PANTOPRAZOLE SODIUM 40 MILLIGRAM(S): 20 TABLET, DELAYED RELEASE ORAL at 05:33

## 2021-08-02 RX ADMIN — ENOXAPARIN SODIUM 40 MILLIGRAM(S): 100 INJECTION SUBCUTANEOUS at 11:58

## 2021-08-02 RX ADMIN — ALBUTEROL 1.25 MILLIGRAM(S): 90 AEROSOL, METERED ORAL at 17:44

## 2021-08-02 RX ADMIN — Medication 0.25 MILLIGRAM(S): at 17:44

## 2021-08-02 RX ADMIN — ATORVASTATIN CALCIUM 40 MILLIGRAM(S): 80 TABLET, FILM COATED ORAL at 21:11

## 2021-08-02 RX ADMIN — ALBUTEROL 1.25 MILLIGRAM(S): 90 AEROSOL, METERED ORAL at 11:58

## 2021-08-02 RX ADMIN — AMLODIPINE BESYLATE 5 MILLIGRAM(S): 2.5 TABLET ORAL at 05:33

## 2021-08-02 NOTE — CHART NOTE - NSCHARTNOTEFT_GEN_A_CORE
Notified by RN of patient with RVP returning from the lab COVID-19 positive. Additional rapid RVP test sent to confirm diagnosis. Earlier this evening, patient began expressing symptoms of fever to 102.2F, as well as cough. Currently, patient denies symptoms of headache, chills, diaphoresis, chest pain, SOB, abdominal pain, N/V/D. Vital signs are stable and patient is not currently requiring additional oxygen. Patient informed of diagnosis and all questions were answered. Patient expressed understanding. Patients daughter, Ly, called and notified of patients condition as well ( Session ID [id number]). Dr. Adams to be called in the morning to update on patients condition. Patient to be transferred to PICU for further management.    Lexy Méndez PA-C  [facility]   [id number] Notified by RN of patient with RVP returning from the lab COVID-19 positive. Additional rapid RVP test sent to confirm diagnosis. Earlier this evening, patient began expressing symptoms of fever to 102.2F, as well as cough. Currently, patient denies symptoms of headache, chills, diaphoresis, chest pain, SOB, abdominal pain, N/V/D. Vital signs are stable and patient is not currently requiring additional oxygen. Patient informed of diagnosis and all questions were answered. Patient expressed understanding. Patients daughter, Ly, called and notified of patients condition as well ( Session ID [id number]). Dr. Adams called and case discussed. Will start patient on Remdesivir and call for ID consult. Patient to be transferred to PICU for further management.    Lexy Méndez PA-C  [facility]   [id number] Notified by RN of patient with RVP resulting from the lab COVID-19 positive. Additional rapid RVP test sent to confirm diagnosis. Earlier this evening, patient began expressing symptoms of fever to 102.2F, as well as cough. Currently, patient denies symptoms of headache, chills, diaphoresis, chest pain, SOB, abdominal pain, N/V/D. Vital signs are stable and patient is not currently requiring additional oxygen. Patient informed of diagnosis and all questions were answered. Patient expressed understanding. Patients daughter, Ly, called and notified of patients condition as well ( Session ID [id number]). Dr. Adams called and case discussed. Will start patient on Remdesivir and call for ID consult. Patient to be transferred to PICU for further management.    Lexy Méndez PA-C  [facility]   [id number]

## 2021-08-02 NOTE — DISCHARGE NOTE NURSING/CASE MANAGEMENT/SOCIAL WORK - NSDCFUADDAPPT_GEN_ALL_CORE_FT
Follow up with your primary care provider on an outpatient basis post discharge.  Follow up with your primary care provider on an outpatient basis post discharge.     To obtain information about travel guidelines and restrictions, please contact the [place] Department of [facility] at Ph: [phone]

## 2021-08-02 NOTE — PROGRESS NOTE ADULT - ATTENDING COMMENTS
75 y/o woman with HTN from [facility] here w/ atypical chest pain x 1 day.  --Atypical chest pain, negative troponins, and unrevealing electrocardiogram.  --Pulmonary input noted; follow-up LE dopplers.  --Patient complains of headache this AM--Medicine follow-up.  --Patient with cardiac risk factors with no reported prior cardiac work-up.  --Check Nuclear stress and echocardiogram.  --Will follow.
duplex le  agree w above
agree w above

## 2021-08-02 NOTE — DISCHARGE NOTE NURSING/CASE MANAGEMENT/SOCIAL WORK - PATIENT PORTAL LINK FT
You can access the FollowMyHealth Patient Portal offered by [facility] by registering at the following website: [email or web address]. By joining [employer]’s FollowMyHealth portal, you will also be able to view your health information using other applications (apps) compatible with our system.

## 2021-08-02 NOTE — CONSULT NOTE ADULT - SUBJECTIVE AND OBJECTIVE BOX
Patient is a 76y old  Female who presents with a chief complaint of cp (01 Aug 2021 07:39)    HPI:  77y/o F c/o pain in thechest described as warmth and associated with shortness of breath; pain  described as pressure radiating to the left arm to the neck  .  Started this morning; states she was laying down    States she awoke with pressure/pain and was sweating.    States symptoms lasted about 1 hour.  recently came to [place] from [place] weeks ago. no h/o dvt/pe.    Denies cough/congestion, denies fever/chills, denies n/v/d, denies back pain, denies leg swelling/calf pain. Nonsmoker.  (30 Jul 2021 18:50)      PAST MEDICAL & SURGICAL HISTORY:  HTN, age 0-18        Social history:    FAMILY HISTORY:    R        Allergic/Immunologic:	No hives or rash   Allergies    penicillin (Rash)    Intolerances        Antimicrobials:    remdesivir  IVPB   IV Intermittent         Vital Signs Last 24 Hrs  T(C): 37.3 (02 Aug 2021 12:10), Max: 39.3 (01 Aug 2021 20:15)  T(F): 99.1 (02 Aug 2021 12:10), Max: 102.8 (01 Aug 2021 20:15)  HR: 84 (02 Aug 2021 12:10) (77 - 90)  BP: 125/77 (02 Aug 2021 12:10) (103/61 - 136/81)  BP(mean): --  RR: 18 (02 Aug 2021 12:10) (17 - 18)  SpO2: 97% (02 Aug 2021 12:10) (93% - 98%)    PHYSICAL EXAM:Pleasant patient in no acute distress.      Constitutional:Comfortable.Awake and alert  No cachexia     Eyes:PERRL EOMI.NO discharge or conjunctival injection    ENMT:No sinus tenderness.No thrush.No pharyngeal exudate or erythema.Fair dental hygiene    Neck:Supple,No LN,no JVD      Respiratory:Good air entry bilaterally,CTA    Cardiovascular:S1 S2 wnl, No murmurs,rub or gallops    Gastrointestinal:Soft BS(+) no tenderness no masses ,No rebound or guarding    Genitourinary:No CVA tendereness     Rectal:    Extremities:No cyanosis,clubbing or edema.    Vascular:peripheral pulses felt    Neurological:AAO X 3,No grossly focal deficits    Skin:No rash     Lymph Nodes:No palpable LNs    Musculoskeletal:No joint swelling or LOM    Psychiatric:Affect normal.                                13.8   9.69  )-----------( 214      ( 01 Aug 2021 20:34 )             41.5         08-02    x   |  x   |  x   ----------------------------<  x   x    |  x   |  0.78      TPro  7.6  /  Alb  4.0  /  TBili  0.4  /  DBili  <0.1  /  AST  45<H>  /  ALT  41  /  AlkPhos  130<H>  08-02      RECENT CULTURES:      MICROBIOLOGY:          Radiology:      Assessment:        Recommendations and Plan:    Pager [phone]  After 5 pm/weekends or if no response :[phone]      
Patient seen and evaluated at bedside    Reason for consult: ischemic workup    HPI:  76F Croatian-speaking with HTN (came from [facility]) who presented with one day of sharp and dull mid sternal chest pain that woke her up last night. She has not had this pain before. Its worse with palpation, does not get worse with exertion and is currently resolved. Trops neg x 2, ECG NSR. ROS otherwise negative. Cardiology called for ischemic workup.      PMHx:   HTN  PSHx:     Allergies:  penicillin (Rash)      Home Meds:    Current Medications:   ALBUTerol   0.042% 1.25 milliGRAM(s) Nebulizer four times a day  amLODIPine   Tablet 5 milliGRAM(s) Oral daily  atorvastatin 40 milliGRAM(s) Oral at bedtime  buDESOnide    Inhalation Suspension 0.25 milliGRAM(s) Inhalation two times a day  enoxaparin Injectable 40 milliGRAM(s) SubCutaneous daily      FAMILY HISTORY:    Social History: no toxic habits  Smoking History:  Alcohol Use:  Drug Use:    Review of Systems:  REVIEW OF SYSTEMS:  CONSTITUTIONAL: No weakness, fevers or chills  EYES/ENT: No visual changes;  No dysphagia  NECK: No pain or stiffness  RESPIRATORY: No cough, wheezing, hemoptysis; No shortness of breath  CARDIOVASCULAR: No palpitations; No lower extremity edema  GASTROINTESTINAL: No abdominal or epigastric pain. No nausea, vomiting, or hematemesis; No diarrhea or constipation. No melena or hematochezia.  BACK: No back pain  GENITOURINARY: No dysuria, frequency or hematuria  NEUROLOGICAL: No numbness or weakness  SKIN: No itching, burning, rashes, or lesions   All other review of systems is negative unless indicated above.    [x ] All other systems negative  [ ] Unable to assess ROS due to    Physical Exam:  T(F): 98.3 (07-31), Max: 99.1 (07-30)  HR: 69 (07-31) (63 - 71)  BP: 122/76 (07-31) (107/72 - 159/85)  RR: 18 (07-31)  SpO2: 96% (07-31)  GENERAL: No acute distress, well-developed  HEAD:  Atraumatic, Normocephalic  ENT: EOMI, PERRLA, conjunctiva and sclera clear, Neck supple, No JVD, moist mucosa  CHEST/LUNG: Clear to auscultation bilaterally; No wheeze, equal breath sounds bilaterally   BACK: No spinal tenderness  HEART: Regular rate and rhythm; No murmurs, rubs, or gallops  ABDOMEN: Soft, Nontender, Nondistended; Bowel sounds present  EXTREMITIES:  No clubbing, cyanosis, or edema  PSYCH: Nl behavior, nl affect  NEUROLOGY: AAOx3, non-focal, cranial nerves intact  SKIN: Normal color, No rashes or lesions  LINES:        CXR: Personally reviewed    Labs: Personally reviewed                        12.9   7.44  )-----------( 230      ( 31 Jul 2021 06:56 )             39.6     07-31    138  |  103  |  12  ----------------------------<  72  3.6   |  23  |  0.68    Ca    9.3      31 Jul 2021 06:56    TPro  7.5  /  Alb  4.1  /  TBili  0.3  /  DBili  x   /  AST  21  /  ALT  19  /  AlkPhos  111  07-30

## 2021-08-02 NOTE — PROGRESS NOTE ADULT - SUBJECTIVE AND OBJECTIVE BOX
Follow-up Pulm Progress Note    Feeling okay  Denies dyspnea/CP  Sats 97% RA     Medications:  MEDICATIONS  (STANDING):  ALBUTerol   0.042% 1.25 milliGRAM(s) Nebulizer four times a day  amLODIPine   Tablet 5 milliGRAM(s) Oral daily  atorvastatin 40 milliGRAM(s) Oral at bedtime  buDESOnide    Inhalation Suspension 0.25 milliGRAM(s) Inhalation two times a day  enoxaparin Injectable 40 milliGRAM(s) SubCutaneous daily  pantoprazole    Tablet 40 milliGRAM(s) Oral before breakfast  remdesivir  IVPB   IV Intermittent     MEDICATIONS  (PRN):  acetaminophen   Tablet .. 650 milliGRAM(s) Oral every 6 hours PRN Mild Pain (1 - 3)  aluminum hydroxide/magnesium hydroxide/simethicone Suspension 30 milliLiter(s) Oral every 4 hours PRN Dyspepsia  guaiFENesin Oral Liquid (Sugar-Free) 200 milliGRAM(s) Oral every 6 hours PRN Cough          Vital Signs Last 24 Hrs  T(C): 37.3 (02 Aug 2021 12:10), Max: 39.3 (01 Aug 2021 20:15)  T(F): 99.1 (02 Aug 2021 12:10), Max: 102.8 (01 Aug 2021 20:15)  HR: 84 (02 Aug 2021 12:10) (77 - 90)  BP: 125/77 (02 Aug 2021 12:10) (103/61 - 136/81)  BP(mean): --  RR: 18 (02 Aug 2021 12:10) (17 - 18)  SpO2: 97% (02 Aug 2021 12:10) (93% - 98%)           @ 07:01  -  08-02 @ 07:00  --------------------------------------------------------  IN: 720 mL / OUT: 0 mL / NET: 720 mL          LABS:                        13.8   9.69  )-----------( 214      ( 01 Aug 2021 20:34 )             41.5     0802    x   |  x   |  x   ----------------------------<  x   x    |  x   |  0.78      TPro  7.6  /  Alb  4.0  /  TBili  0.4  /  DBili  <0.1  /  AST  45<H>  /  ALT  41  /  AlkPhos  130<H>  08          PT/INR - ( 02 Aug 2021 06:53 )   PT: 13.4 sec;   INR: 1.12 ratio           Urinalysis Basic - ( 01 Aug 2021 23:16 )    Color: Colorless / Appearance: Clear / S.008 / pH: x  Gluc: x / Ketone: Negative  / Bili: Negative / Urobili: Negative   Blood: x / Protein: Negative / Nitrite: Negative   Leuk Esterase: Negative / RBC: 1 /hpf / WBC 2 /HPF   Sq Epi: x / Non Sq Epi: 0 /hpf / Bacteria: Negative        Physical Examination:  PULM: Clear to auscultation bilaterally, no significant sputum production  CVS: S1, S2 heard    RADIOLOGY REVIEWED  CXR:  grossly clear lungs    TTE: < from: Transthoracic Echocardiogram (21 @ 10:45) >  Dimensions:    Normal Values:  LA:     2.9    2.0 - 4.0 cm  Ao:     2.6    2.0 - 3.8 cm  SEPTUM: 0.6    0.6 - 1.2 cm  PWT:    0.6    0.6 -1.1 cm  LVIDd:  4.3    3.0 - 5.6 cm  LVIDs:  2.7    1.8 - 4.0 cm  Derived variables:  LVMI: 43 g/m2  RWT: 0.27  Fractional short: 37 %  EF (Teicholtz): 67 %  ------------------------------------------------------------------------  Observations:  Mitral Valve: Mitral annular calcification, otherwise  normal mitral valve. Minimal mitral regurgitation.  Aortic Valve/Aorta: Aortic valve leaflet morphology not  well visualized.  Normal aortic root size. (Ao: 2.6 cm at the sinuses of  Valsalva).  LeftAtrium: Normal left atrium.  LA volume index = 18  cc/m2.  Left Ventricle: Endocardium not well visualized; grossly  normal left ventricular systolic function. Normal left  ventricular internal dimensions and wall thicknesses. Mild  diastolic dysfunction (Stage I).  Right Heart: Normal right atrium. Normal right ventricular  size and function. Normal tricuspid valve. Minimal  tricuspid regurgitation. Normal pulmonic valve.  Pericardium/Pleura: Normal pericardium with no pericardial  effusion.  Hemodynamic: Estimated right atrial pressure is 8 mm Hg.  Estimated right ventricular systolic pressure equals 38 mm  Hg, assuming right atrial pressure equals 8 mm Hg,  consistent with borderline pulmonary hypertension.  ------------------------------------------------------------------------  Conclusions:  1. Mitral annular calcification, otherwise normal mitral  valve. Minimal mitral regurgitation.  2. Normal left ventricular internal dimensions and wall  thicknesses.  3. Endocardium not well visualized; grossly normal left  ventricular systolic function.  4. Mild diastolic dysfunction (Stage I).  5. Normal right ventricular size and function.  6. Estimated right ventricular systolic pressure equals 38  mm Hg, assuming right atrial pressure equals 8 mm Hg,  consistent with borderline pulmonary hypertension.  ------------------------------------------------------------------------    < end of copied text >

## 2021-08-02 NOTE — PROGRESS NOTE ADULT - SUBJECTIVE AND OBJECTIVE BOX
DATE OF SERVICE: 08-02-21 @ 13:05  CHIEF COMPLAINT:Patient is a 76y old  Female who presents with a chief complaint of covid (02 Aug 2021 12:47)    	        PAST MEDICAL & SURGICAL HISTORY:  HTN, age 0-18            events noted  pt w/ out complaints at present      Medications:  MEDICATIONS  (STANDING):  ALBUTerol   0.042% 1.25 milliGRAM(s) Nebulizer four times a day  amLODIPine   Tablet 5 milliGRAM(s) Oral daily  atorvastatin 40 milliGRAM(s) Oral at bedtime  buDESOnide    Inhalation Suspension 0.25 milliGRAM(s) Inhalation two times a day  enoxaparin Injectable 40 milliGRAM(s) SubCutaneous daily  pantoprazole    Tablet 40 milliGRAM(s) Oral before breakfast  remdesivir  IVPB   IV Intermittent     MEDICATIONS  (PRN):  acetaminophen   Tablet .. 650 milliGRAM(s) Oral every 6 hours PRN Mild Pain (1 - 3)  aluminum hydroxide/magnesium hydroxide/simethicone Suspension 30 milliLiter(s) Oral every 4 hours PRN Dyspepsia  guaiFENesin Oral Liquid (Sugar-Free) 200 milliGRAM(s) Oral every 6 hours PRN Cough    	    PHYSICAL EXAM:  T(C): 37.3 (08-02-21 @ 12:10), Max: 39.3 (08-01-21 @ 20:15)  HR: 84 (08-02-21 @ 12:10) (77 - 90)  BP: 125/77 (08-02-21 @ 12:10) (103/61 - 136/81)  RR: 18 (08-02-21 @ 12:10) (17 - 18)  SpO2: 97% (08-02-21 @ 12:10) (93% - 98%)  Wt(kg): --  I&O's Summary    01 Aug 2021 07:01  -  02 Aug 2021 07:00  --------------------------------------------------------  IN: 720 mL / OUT: 0 mL / NET: 720 mL    02 Aug 2021 07:01  -  02 Aug 2021 13:05  --------------------------------------------------------  IN: 480 mL / OUT: 0 mL / NET: 480 mL        Appearance: Normal	  HEENT:   Normal oral mucosa, PERRL, EOMI	  Lymphatic: No lymphadenopathy  Cardiovascular: Normal S1 S2, No JVD, N  Respiratory: Lungs clear to auscultation	  Psychiatry: A & O   Gastrointestinal:  Soft, Non-tender, + BS	  Skin: No rashes, No ecchymoses, No cyanosis	  Neurologic: Non-focal  Extremities: Normal range of motion, No clubbing, cyanosis or edema      TELEMETRY: 	    ECG:  	  RADIOLOGY:  OTHER: 	  	  LABS:	 	    CARDIAC MARKERS:                                13.8   9.69  )-----------( 214      ( 01 Aug 2021 20:34 )             41.5     08-02    x   |  x   |  x   ----------------------------<  x   x    |  x   |  0.78      TPro  7.6  /  Alb  4.0  /  TBili  0.4  /  DBili  <0.1  /  AST  45<H>  /  ALT  41  /  AlkPhos  130<H>  08-02    proBNP:   Lipid Profile:   HgA1c:   TSH:

## 2021-08-02 NOTE — CONSULT NOTE ADULT - ASSESSMENT
76 year old admitted with covid  not on oxygen  sats are normal no pna on chest xray   non toxic with chest pain and cough  vaccine history not available    no indication for steroids  Suspect we can send home in the am if stable    will dc remdesivr at the time of discharge

## 2021-08-03 VITALS
OXYGEN SATURATION: 96 % | TEMPERATURE: 98 F | RESPIRATION RATE: 17 BRPM | SYSTOLIC BLOOD PRESSURE: 134 MMHG | DIASTOLIC BLOOD PRESSURE: 82 MMHG | HEART RATE: 73 BPM

## 2021-08-03 LAB
ALBUMIN SERPL ELPH-MCNC: 3.9 G/DL — SIGNIFICANT CHANGE UP (ref 3.3–5)
ALP SERPL-CCNC: 138 U/L — HIGH (ref 40–120)
ALT FLD-CCNC: 52 U/L — HIGH (ref 10–45)
AST SERPL-CCNC: 50 U/L — HIGH (ref 10–40)
BILIRUB DIRECT SERPL-MCNC: <0.1 MG/DL — SIGNIFICANT CHANGE UP (ref 0–0.2)
BILIRUB INDIRECT FLD-MCNC: >0.2 MG/DL — SIGNIFICANT CHANGE UP (ref 0.2–1)
BILIRUB SERPL-MCNC: 0.3 MG/DL — SIGNIFICANT CHANGE UP (ref 0.2–1.2)
CREAT SERPL-MCNC: 0.73 MG/DL — SIGNIFICANT CHANGE UP (ref 0.5–1.3)
CRP SERPL-MCNC: 64 MG/L — HIGH (ref 0–4)
D DIMER BLD IA.RAPID-MCNC: 152 NG/ML DDU — SIGNIFICANT CHANGE UP
FERRITIN SERPL-MCNC: 185 NG/ML — HIGH (ref 15–150)
HCT VFR BLD CALC: 40.8 % — SIGNIFICANT CHANGE UP (ref 34.5–45)
HGB BLD-MCNC: 13.2 G/DL — SIGNIFICANT CHANGE UP (ref 11.5–15.5)
INR BLD: 1.12 RATIO — SIGNIFICANT CHANGE UP (ref 0.88–1.16)
LDH SERPL L TO P-CCNC: 178 U/L — SIGNIFICANT CHANGE UP (ref 50–242)
MCHC RBC-ENTMCNC: 29.1 PG — SIGNIFICANT CHANGE UP (ref 27–34)
MCHC RBC-ENTMCNC: 32.4 GM/DL — SIGNIFICANT CHANGE UP (ref 32–36)
MCV RBC AUTO: 90.1 FL — SIGNIFICANT CHANGE UP (ref 80–100)
NRBC # BLD: 0 /100 WBCS — SIGNIFICANT CHANGE UP (ref 0–0)
PLATELET # BLD AUTO: 197 K/UL — SIGNIFICANT CHANGE UP (ref 150–400)
PROT SERPL-MCNC: 7.5 G/DL — SIGNIFICANT CHANGE UP (ref 6–8.3)
PROTHROM AB SERPL-ACNC: 13.4 SEC — SIGNIFICANT CHANGE UP (ref 10.6–13.6)
RBC # BLD: 4.53 M/UL — SIGNIFICANT CHANGE UP (ref 3.8–5.2)
RBC # FLD: 13.7 % — SIGNIFICANT CHANGE UP (ref 10.3–14.5)
WBC # BLD: 5.63 K/UL — SIGNIFICANT CHANGE UP (ref 3.8–10.5)
WBC # FLD AUTO: 5.63 K/UL — SIGNIFICANT CHANGE UP (ref 3.8–10.5)

## 2021-08-03 PROCEDURE — U0003: CPT

## 2021-08-03 PROCEDURE — 93970 EXTREMITY STUDY: CPT

## 2021-08-03 PROCEDURE — 86769 SARS-COV-2 COVID-19 ANTIBODY: CPT

## 2021-08-03 PROCEDURE — 70450 CT HEAD/BRAIN W/O DYE: CPT

## 2021-08-03 PROCEDURE — 85610 PROTHROMBIN TIME: CPT

## 2021-08-03 PROCEDURE — 83615 LACTATE (LD) (LDH) ENZYME: CPT

## 2021-08-03 PROCEDURE — 71046 X-RAY EXAM CHEST 2 VIEWS: CPT

## 2021-08-03 PROCEDURE — 99285 EMERGENCY DEPT VISIT HI MDM: CPT | Mod: 25

## 2021-08-03 PROCEDURE — 80048 BASIC METABOLIC PNL TOTAL CA: CPT

## 2021-08-03 PROCEDURE — 81001 URINALYSIS AUTO W/SCOPE: CPT

## 2021-08-03 PROCEDURE — 93306 TTE W/DOPPLER COMPLETE: CPT

## 2021-08-03 PROCEDURE — 94640 AIRWAY INHALATION TREATMENT: CPT

## 2021-08-03 PROCEDURE — 99232 SBSQ HOSP IP/OBS MODERATE 35: CPT

## 2021-08-03 PROCEDURE — 85025 COMPLETE CBC W/AUTO DIFF WBC: CPT

## 2021-08-03 PROCEDURE — 85027 COMPLETE CBC AUTOMATED: CPT

## 2021-08-03 PROCEDURE — 86803 HEPATITIS C AB TEST: CPT

## 2021-08-03 PROCEDURE — 84484 ASSAY OF TROPONIN QUANT: CPT

## 2021-08-03 PROCEDURE — 82728 ASSAY OF FERRITIN: CPT

## 2021-08-03 PROCEDURE — 86140 C-REACTIVE PROTEIN: CPT

## 2021-08-03 PROCEDURE — 87040 BLOOD CULTURE FOR BACTERIA: CPT

## 2021-08-03 PROCEDURE — 0225U NFCT DS DNA&RNA 21 SARSCOV2: CPT

## 2021-08-03 PROCEDURE — 82565 ASSAY OF CREATININE: CPT

## 2021-08-03 PROCEDURE — 80076 HEPATIC FUNCTION PANEL: CPT

## 2021-08-03 PROCEDURE — U0005: CPT

## 2021-08-03 PROCEDURE — 80053 COMPREHEN METABOLIC PANEL: CPT

## 2021-08-03 PROCEDURE — 85379 FIBRIN DEGRADATION QUANT: CPT

## 2021-08-03 RX ORDER — ACETAMINOPHEN 500 MG
2 TABLET ORAL
Qty: 0 | Refills: 0 | DISCHARGE
Start: 2021-08-03

## 2021-08-03 RX ORDER — ACETAMINOPHEN 500 MG
1 TABLET ORAL
Qty: 0 | Refills: 0 | DISCHARGE

## 2021-08-03 RX ADMIN — ENOXAPARIN SODIUM 40 MILLIGRAM(S): 100 INJECTION SUBCUTANEOUS at 13:19

## 2021-08-03 RX ADMIN — Medication 0.25 MILLIGRAM(S): at 05:57

## 2021-08-03 RX ADMIN — AMLODIPINE BESYLATE 5 MILLIGRAM(S): 2.5 TABLET ORAL at 05:57

## 2021-08-03 RX ADMIN — PANTOPRAZOLE SODIUM 40 MILLIGRAM(S): 20 TABLET, DELAYED RELEASE ORAL at 05:59

## 2021-08-03 RX ADMIN — ALBUTEROL 1.25 MILLIGRAM(S): 90 AEROSOL, METERED ORAL at 05:57

## 2021-08-03 RX ADMIN — ALBUTEROL 1.25 MILLIGRAM(S): 90 AEROSOL, METERED ORAL at 13:18

## 2021-08-03 RX ADMIN — Medication 650 MILLIGRAM(S): at 00:34

## 2021-08-03 RX ADMIN — ALBUTEROL 1.25 MILLIGRAM(S): 90 AEROSOL, METERED ORAL at 00:56

## 2021-08-03 RX ADMIN — Medication 200 MILLIGRAM(S): at 05:58

## 2021-08-03 NOTE — DISCHARGE NOTE PROVIDER - HOSPITAL COURSE
76 year old female, c/o pain in thechest described as warmth and associated with shortness of breath; pain  described as pressure radiating to the left arm to the neck  .  Started this morning; states she was laying down    States she awoke with pressure/pain and was sweating.    States symptoms lasted about 1 hour.  recently came to [place] from [place] few weeks ago. no h/o dvt/pe.    Denies cough/congestion, denies fever/chills, denies n/v/d, denies back pain, denies leg swelling/calf pain. Nonsmoker.    76 year old Montserratian-speaking female, with past medical history of HTN, who is presenting with one day of sharp and dull mid-sternal chest pain that woke her up.  Troponins negative x 2.  ECG shows NSR.  Cardiology consulted for ischemic workup.  CT head negative.  Chest xray shows probable small granulomas in the right midlung with linear scarring or atelectasis in the right perihilar region; linear atelectasis or scarring at the left lung base; no evidence of acute pneumonia.    Transthoracic echocardiogram reveals and EF of 67% and shows   1. Mitral annular calcification, otherwise normal mitral  valve. Minimal mitral regurgitation.  2. Normal left ventricular internal dimensions and wall  thicknesses.  3. Endocardium not well visualized; grossly normal left  ventricular systolic function.  4. Mild diastolic dysfunction (Stage I).  5. Normal right ventricular size and function.  6. Estimated right ventricular systolic pressure equals 38  mm Hg, assuming right atrial pressure equals 8 mm Hg,  consistent with borderline pulmonary hypertension.    Patient noted to be febrile - urinalysis negative; found to be positive for Covid-19.  Started on Remdesivir.  Infectious Disease consulted.  Patient stating 97% on room air.  Remdesivir discontinued.  Patient cleared for discharge, by Dr. Adams, ID, and Cardiology, with PCP and Cardiology follow up.      76 year old Russian-speaking female, with past medical history of HTN, who is presenting with one day of sharp and dull mid-sternal chest pain that woke her up.  Troponins negative x 2.  ECG shows NSR.  Cardiology consulted for ischemic workup.  CT head negative.  Chest xray shows probable small granulomas in the right midlung with linear scarring or atelectasis in the right perihilar region; linear atelectasis or scarring at the left lung base; no evidence of acute pneumonia.    Transthoracic echocardiogram reveals and EF of 67% and shows   1. Mitral annular calcification, otherwise normal mitral  valve. Minimal mitral regurgitation.  2. Normal left ventricular internal dimensions and wall  thicknesses.  3. Endocardium not well visualized; grossly normal left  ventricular systolic function.  4. Mild diastolic dysfunction (Stage I).  5. Normal right ventricular size and function.  6. Estimated right ventricular systolic pressure equals 38  mm Hg, assuming right atrial pressure equals 8 mm Hg,  consistent with borderline pulmonary hypertension.    Patient noted to be febrile - urinalysis negative; found to be positive for Covid-19.  Started on Remdesivir.  Infectious Disease consulted.  Bilateral lower extremity dopplers negative for DVT.  Patient stating 97% on room air.  Remdesivir discontinued.  Patient cleared for discharge, by Dr. Adams, ID, and Cardiology, with PCP and Cardiology follow up.      76 year old [place] Verdean-speaking female, with past medical history of HTN, who is presenting with one day of sharp and dull mid-sternal chest pain that woke her up.  Troponins negative x 2.  ECG shows NSR.  Cardiology consulted for ischemic workup.  CT head negative.  Chest xray shows probable small granulomas in the right midlung with linear scarring or atelectasis in the right perihilar region; linear atelectasis or scarring at the left lung base; no evidence of acute pneumonia.    Transthoracic echocardiogram reveals and EF of 67% and shows   1. Mitral annular calcification, otherwise normal mitral  valve. Minimal mitral regurgitation.  2. Normal left ventricular internal dimensions and wall  thicknesses.  3. Endocardium not well visualized; grossly normal left  ventricular systolic function.  4. Mild diastolic dysfunction (Stage I).  5. Normal right ventricular size and function.  6. Estimated right ventricular systolic pressure equals 38  mm Hg, assuming right atrial pressure equals 8 mm Hg,  consistent with borderline pulmonary hypertension.    Patient noted to be febrile - urinalysis negative; found to be positive for Covid-19.  Started on Remdesivir.  Infectious Disease consulted.  Bilateral lower extremity dopplers negative for DVT.  Patient stating 97% on room air.  Remdesivir discontinued.  Patient cleared for discharge, by Dr. Adams, ID, and Cardiology, with PCP, Cardiology, and Pulmonary follow up.

## 2021-08-03 NOTE — DISCHARGE NOTE PROVIDER - NSDCMRMEDTOKEN_GEN_ALL_CORE_FT
acetaminophen 325 mg oral tablet: 2 tab(s) orally every 8 hours, As Needed - for mild pain   amlodipino 5mg tablet: 1 tab(s) orally once a day (in the evening)  beclometasona Dipropionata 50mcg inhaler: 2 puff(s) inhaled 2 times a day  Bromuro de Ipratropio 20mcg inhaler: 2 puff(s) inhaled 2 times a day  Diosmina/Hesperida 450mg/5mg tablet: 1 tab(s) orally 2 times a day  rosuvina 40mg tablet: 1 tab(s) orally once a day  Salbutamol 100mcg inhaler: 2 puff(s) inhaled 2 times a day

## 2021-08-03 NOTE — DISCHARGE NOTE PROVIDER - NSDCCPCAREPLAN_GEN_ALL_CORE_FT
PRINCIPAL DISCHARGE DIAGNOSIS  Diagnosis: Chest pain  Assessment and Plan of Treatment: Resolved  You will need to follow up with your cardiologist within one week of discharge - please call to make an appointment.      SECONDARY DISCHARGE DIAGNOSES  Diagnosis: COVID-19  Assessment and Plan of Treatment: You tested positive for Covid-19 on August 1, 2021.  You must quarantine for 10 days.  You will need to follow up with your primary medical doctor within one week of discharge - please call to make an appointment.  You no longer require hospitalization.  Please restrict activities outside of your home except for getting medical care.  Do not go to work, school, or public areas.  Avoid using public transportation, ride-sharing, or taxis.  Separate yourself from other people and animals in your home.  Call ahead before visiting your doctor.  Wear a facemask when you are around other people. Cover your cough and sneezes.  Clean your hands often.  Avoid sharing personal household items.  Clean all frequently touched surfaces daily.       PRINCIPAL DISCHARGE DIAGNOSIS  Diagnosis: Chest pain  Assessment and Plan of Treatment: Resolved  You will need to follow up with your cardiologist within 1-2 weeks of discharge - please call to make an appointment.      SECONDARY DISCHARGE DIAGNOSES  Diagnosis: COVID-19  Assessment and Plan of Treatment: You tested positive for Covid-19 on August 1, 2021.  You must quarantine for 10 days.  You will need to follow up with your primary medical doctor within one week of discharge - please call to make an appointment.  You no longer require hospitalization.  Please restrict activities outside of your home except for getting medical care.  Do not go to work, school, or public areas.  Avoid using public transportation, ride-sharing, or taxis.  Separate yourself from other people and animals in your home.  Call ahead before visiting your doctor.  Wear a facemask when you are around other people. Cover your cough and sneezes.  Clean your hands often.  Avoid sharing personal household items.  Clean all frequently touched surfaces daily.      Diagnosis: Shortness of breath  Assessment and Plan of Treatment: Improved  You will need to follow up with your pulmonologist within 1-2 weeks of discharge - please call to make an appointment.     PRINCIPAL DISCHARGE DIAGNOSIS  Diagnosis: Chest pain  Assessment and Plan of Treatment: Resolved  You will need to follow up with your cardiologist within 1-2 weeks of discharge - please call to make an appointment.      SECONDARY DISCHARGE DIAGNOSES  Diagnosis: COVID-19  Assessment and Plan of Treatment: You tested positive for Covid-19 on August 1, 2021.  You must quarantine for 10 days.  You will need to follow up with your primary medical doctor within one week of discharge - please call to make an appointment.  You no longer require hospitalization.  Please restrict activities outside of your home except for getting medical care.  Do not go to work, school, or public areas.  Avoid using public transportation, ride-sharing, or taxis.  Separate yourself from other people and animals in your home.  Call ahead before visiting your doctor.  Wear a facemask when you are around other people. Cover your cough and sneezes.  Clean your hands often.  Avoid sharing personal household items.  Clean all frequently touched surfaces daily.      Diagnosis: Shortness of breath  Assessment and Plan of Treatment: Improved  You will need to follow up with your pulmonologist within 1-2 weeks of discharge - please call to make an appointment.    Diagnosis: Abnormal liver function test  Assessment and Plan of Treatment: You LFTs were noted to be elevated - likely due to your covid-19 infection.  You will need to follow up with your primary medical doctor to have your LFTs repeated, when your infection clears, due ensure that they return to normal.

## 2021-08-03 NOTE — DISCHARGE NOTE PROVIDER - NSDCFUADDAPPT_GEN_ALL_CORE_FT
Follow up with your primary care provider on an outpatient basis post discharge.     To obtain information about travel guidelines and restrictions, please contact the [place] [facility] of [facility] at Ph: [phone] Follow up with your primary care provider and your cardiologist within one week of discharge - please call to make these appointments.     To obtain information about travel guidelines and restrictions, please contact the [place] [facility] of [facility] at Ph: [phone] Follow up with your primary care provider within one week of discharge - please call to make this appointment.     Follow up with your pulmonologist and cardiologist within 1-2 weeks of discharge - please call to make these appointments.    To obtain information about travel guidelines and restrictions, please contact the [place] [facility] of [facility] at Ph: [phone]

## 2021-08-03 NOTE — PROGRESS NOTE ADULT - SUBJECTIVE AND OBJECTIVE BOX
infectious diseases progress note:    Patient is a 76y old  Female who presents with a chief complaint of covid (02 Aug 2021 12:47)        Chest pain               Allergies    penicillin (Rash)    Intolerances        ANTIBIOTICS/RELEVANT:  antimicrobials  remdesivir  IVPB   IV Intermittent   remdesivir  IVPB 100 milliGRAM(s) IV Intermittent every 24 hours    immunologic:    OTHER:  acetaminophen   Tablet .. 650 milliGRAM(s) Oral every 6 hours PRN  ALBUTerol   0.042% 1.25 milliGRAM(s) Nebulizer four times a day  aluminum hydroxide/magnesium hydroxide/simethicone Suspension 30 milliLiter(s) Oral every 4 hours PRN  amLODIPine   Tablet 5 milliGRAM(s) Oral daily  atorvastatin 40 milliGRAM(s) Oral at bedtime  buDESOnide    Inhalation Suspension 0.25 milliGRAM(s) Inhalation two times a day  enoxaparin Injectable 40 milliGRAM(s) SubCutaneous daily  guaiFENesin Oral Liquid (Sugar-Free) 200 milliGRAM(s) Oral every 6 hours PRN  pantoprazole    Tablet 40 milliGRAM(s) Oral before breakfast      Objective:  Vital Signs Last 24 Hrs  T(C): 36.7 (03 Aug 2021 08:30), Max: 37.4 (02 Aug 2021 16:20)  T(F): 98 (03 Aug 2021 08:30), Max: 99.3 (02 Aug 2021 16:20)  HR: 73 (03 Aug 2021 08:30) (73 - 89)  BP: 134/82 (03 Aug 2021 08:30) (104/63 - 134/82)  BP(mean): --  RR: 17 (03 Aug 2021 08:30) (17 - 18)  SpO2: 96% (03 Aug 2021 08:30) (96% - 97%)       Eyes:KENDALL, EOMI  Ear/Nose/Throat: no oral lesion, no sinus tenderness on percussion	  Neck:no JVD, no lymphadenopathy, supple  Respiratory: CTA sunny  Cardiovascular: S1S2 RRR, no murmurs  Gastrointestinal:soft, (+) BS, no HSM  Extremities:no e/e/c        LABS:                        13.2   5.63  )-----------( 197      ( 03 Aug 2021 08:23 )             40.8     08-02    x   |  x   |  x   ----------------------------<  x   x    |  x   |  0.78      TPro  7.6  /  Alb  4.0  /  TBili  0.4  /  DBili  <0.1  /  AST  45<H>  /  ALT  41  /  AlkPhos  130<H>      PT/INR - ( 02 Aug 2021 06:53 )   PT: 13.4 sec;   INR: 1.12 ratio           Urinalysis Basic - ( 01 Aug 2021 23:16 )    Color: Colorless / Appearance: Clear / S.008 / pH: x  Gluc: x / Ketone: Negative  / Bili: Negative / Urobili: Negative   Blood: x / Protein: Negative / Nitrite: Negative   Leuk Esterase: Negative / RBC: 1 /hpf / WBC 2 /HPF   Sq Epi: x / Non Sq Epi: 0 /hpf / Bacteria: Negative          MICROBIOLOGY:    RECENT CULTURES:   @ 21:48 .Blood Blood                No growth to date.          RESPIRATORY CULTURES:              RADIOLOGY & ADDITIONAL STUDIES:        Pager [phone]  After 5 pm/weekends or if no response :[phone]

## 2021-08-03 NOTE — PROGRESS NOTE ADULT - SUBJECTIVE AND OBJECTIVE BOX
DATE OF SERVICE: 08-03-21 @ 10:36  CHIEF COMPLAINT:Patient is a 76y old  Female who presents with a chief complaint of covid (03 Aug 2021 08:43)    	        PAST MEDICAL & SURGICAL HISTORY:  HTN, age [age]            REVIEW OF SYSTEMS:  no new complaints      Medications:  MEDICATIONS  (STANDING):  ALBUTerol   0.042% 1.25 milliGRAM(s) Nebulizer four times a day  amLODIPine   Tablet 5 milliGRAM(s) Oral daily  atorvastatin 40 milliGRAM(s) Oral at bedtime  buDESOnide    Inhalation Suspension 0.25 milliGRAM(s) Inhalation two times a day  enoxaparin Injectable 40 milliGRAM(s) SubCutaneous daily  pantoprazole    Tablet 40 milliGRAM(s) Oral before breakfast    MEDICATIONS  (PRN):  acetaminophen   Tablet .. 650 milliGRAM(s) Oral every 6 hours PRN Mild Pain (1 - 3)  aluminum hydroxide/magnesium hydroxide/simethicone Suspension 30 milliLiter(s) Oral every 4 hours PRN Dyspepsia  guaiFENesin Oral Liquid (Sugar-Free) 200 milliGRAM(s) Oral every 6 hours PRN Cough    	    PHYSICAL EXAM:  T(C): 36.7 (08-03-21 @ 08:30), Max: 37.4 (08-02-21 @ 16:20)  HR: 73 (08-03-21 @ 08:30) (73 - 89)  BP: 134/82 (08-03-21 @ 08:30) (104/63 - 134/82)  RR: 17 (08-03-21 @ 08:30) (17 - 18)  SpO2: 96% (08-03-21 @ 08:30) (96% - 97%)  Wt(kg): --  I&O's Summary    02 Aug 2021 07:01  -  03 Aug 2021 07:00  --------------------------------------------------------  IN: 720 mL / OUT: 0 mL / NET: 720 mL        Appearance: Normal	  HEENT:   Normal oral mucosa, PERRL, EOMI	  Lymphatic: No lymphadenopathy  Cardiovascular: Normal S1 S2, No JVD, No murmurs, No edema  Respiratory: Lungs clear to auscultation	  Gastrointestinal:  Soft, Non-tender, + BS	  Skin: No rashes, No ecchymoses, No cyanosis	  Neurologic: Non-focal  Extremities: Normal range of motion, No clubbing, cyanosis or edema  Vascular: Peripheral pulses palpable 2+ bilaterally    TELEMETRY: 	    ECG:  	  RADIOLOGY:  OTHER: 	  	  LABS:	 	    CARDIAC MARKERS:                                13.2   5.63  )-----------( 197      ( 03 Aug 2021 08:23 )             40.8     08-03    x   |  x   |  x   ----------------------------<  x   x    |  x   |  0.73      TPro  7.5  /  Alb  3.9  /  TBili  0.3  /  DBili  <0.1  /  AST  50<H>  /  ALT  52<H>  /  AlkPhos  138<H>  08-03    proBNP:   Lipid Profile:   HgA1c:   TSH:

## 2021-08-03 NOTE — PROGRESS NOTE ADULT - SUBJECTIVE AND OBJECTIVE BOX
Follow-up Pulm Progress Note    No new respiratory events overnight.  Denies SOB/CP.     Medications:  MEDICATIONS  (STANDING):  ALBUTerol   0.042% 1.25 milliGRAM(s) Nebulizer four times a day  amLODIPine   Tablet 5 milliGRAM(s) Oral daily  atorvastatin 40 milliGRAM(s) Oral at bedtime  buDESOnide    Inhalation Suspension 0.25 milliGRAM(s) Inhalation two times a day  enoxaparin Injectable 40 milliGRAM(s) SubCutaneous daily  pantoprazole    Tablet 40 milliGRAM(s) Oral before breakfast    MEDICATIONS  (PRN):  acetaminophen   Tablet .. 650 milliGRAM(s) Oral every 6 hours PRN Mild Pain (1 - 3)  aluminum hydroxide/magnesium hydroxide/simethicone Suspension 30 milliLiter(s) Oral every 4 hours PRN Dyspepsia  guaiFENesin Oral Liquid (Sugar-Free) 200 milliGRAM(s) Oral every 6 hours PRN Cough          Vital Signs Last 24 Hrs  T(C): 36.7 (03 Aug 2021 08:30), Max: 37.4 (02 Aug 2021 16:20)  T(F): 98 (03 Aug 2021 08:30), Max: 99.3 (02 Aug 2021 16:20)  HR: 73 (03 Aug 2021 08:30) (73 - 89)  BP: 134/82 (03 Aug 2021 08:30) (104/63 - 134/82)  BP(mean): --  RR: 17 (03 Aug 2021 08:30) (17 - 18)  SpO2: 96% (03 Aug 2021 08:30) (96% - 97%)           @ 07:01  -  08-03 @ 07:00  --------------------------------------------------------  IN: 720 mL / OUT: 0 mL / NET: 720 mL          LABS:                        13.2   5.63  )-----------( 197      ( 03 Aug 2021 08:23 )             40.8     08-03    x   |  x   |  x   ----------------------------<  x   x    |  x   |  0.73      TPro  7.5  /  Alb  3.9  /  TBili  0.3  /  DBili  <0.1  /  AST  50<H>  /  ALT  52<H>  /  AlkPhos  138<H>  08          CAPILLARY BLOOD GLUCOSE        PT/INR - ( 03 Aug 2021 08:23 )   PT: 13.4 sec;   INR: 1.12 ratio           Urinalysis Basic - ( 01 Aug 2021 23:16 )    Color: Colorless / Appearance: Clear / S.008 / pH: x  Gluc: x / Ketone: Negative  / Bili: Negative / Urobili: Negative   Blood: x / Protein: Negative / Nitrite: Negative   Leuk Esterase: Negative / RBC: 1 /hpf / WBC 2 /HPF   Sq Epi: x / Non Sq Epi: 0 /hpf / Bacteria: Negative                      CULTURES:     Culture - Blood (collected 21 @ 21:48)  Source: .Blood Blood  Preliminary Report (21 @ 22:02):    No growth to date.    Culture - Blood (collected 21 @ 21:48)  Source: .Blood Blood  Preliminary Report (21 @ 22:02):    No growth to date.        Physical Examination:  PULM: Clear to auscultation bilaterally, no significant sputum production  CVS: S1, S2 heard    RADIOLOGY REVIEWED  CXR:  grossly clear lungs    TTE: < from: Transthoracic Echocardiogram (21 @ 10:45) >  Dimensions:    Normal Values:  LA:     2.9    2.0 - 4.0 cm  Ao:     2.6    2.0 - 3.8 cm  SEPTUM: 0.6    0.6 - 1.2 cm  PWT:    0.6    0.6 -1.1 cm  LVIDd:  4.3    3.0 - 5.6 cm  LVIDs:  2.7    1.8 - 4.0 cm  Derived variables:  LVMI: 43 g/m2  RWT: 0.27  Fractional short: 37 %  EF (Teicholtz): 67 %  ------------------------------------------------------------------------  Observations:  Mitral Valve: Mitral annular calcification, otherwise  normal mitral valve. Minimal mitral regurgitation.  Aortic Valve/Aorta: Aortic valve leaflet morphology not  well visualized.  Normal aortic root size. (Ao: 2.6 cm at the sinuses of  Valsalva).  LeftAtrium: Normal left atrium.  LA volume index = 18  cc/m2.  Left Ventricle: Endocardium not well visualized; grossly  normal left ventricular systolic function. Normal left  ventricular internal dimensions and wall thicknesses. Mild  diastolic dysfunction (Stage I).  Right Heart: Normal right atrium. Normal right ventricular  size and function. Normal tricuspid valve. Minimal  tricuspid regurgitation. Normal pulmonic valve.  Pericardium/Pleura: Normal pericardium with no pericardial  effusion.  Hemodynamic: Estimated right atrial pressure is 8 mm Hg.  Estimated right ventricular systolic pressure equals 38 mm  Hg, assuming right atrial pressure equals 8 mm Hg,  consistent with borderline pulmonary hypertension.  ------------------------------------------------------------------------  Conclusions:  1. Mitral annular calcification, otherwise normal mitral  valve. Minimal mitral regurgitation.  2. Normal left ventricular internal dimensions and wall  thicknesses.  3. Endocardium not well visualized; grossly normal left  ventricular systolic function.  4. Mild diastolic dysfunction (Stage I).  5. Normal right ventricular size and function.  6. Estimated right ventricular systolic pressure equals 38  mm Hg, assuming right atrial pressure equals 8 mm Hg,  consistent with borderline pulmonary hypertension.  ------------------------------------------------------------------------    < end of copied text >

## 2021-08-03 NOTE — PROGRESS NOTE ADULT - ASSESSMENT
75 y/o with PMH of HTN, questionable lung disease, who presents with chest pain and associated SOB and diaphoresis x1 day. Reportedly on admission pain was described as warmth & pressure with pain radiating down L arm to the neck. Symptoms noted to last 1 hour. [place] recently [place] from [place] a few weeks ago. Troponins negative. Pulmonary called to consult on SOB. CXR with clear lungs. +COVID.    
76F w/ HTN from [place] here w/ atypical chest pain x 1 day. Cardiology called for ischemic eval. Low level of suspicion for ACS given negative trops, lack of symptoms now and lack of ECG changes.    1, Atypical chest pain     -bp and HR at goal   -for TTE/NST today   -f/u FLP, a1c, and TFT   -monitor on telemetry and maintain goal K~4.5 and Mg~2.5    Thank you for allowing us to participate in the care of your patient. If you have any questions or concerns please do not hesitate to contact us 24/7.   All Cardiology service information can be found 24/7 on [email or web address], password: [email or web address] Villalobos MD  PGY-5 Cardiology Fellow, Cj/JONNY  
pt w/ chest pain  r/o acs  trops neg  echo/noted  stress test today  cards eval noted  pulm f/u  id eval noted  covid  remdesivir as per id   c/w meds  dvt proph  
77 y/o with PMH of HTN, questionable lung disease, who presents with chest pain and associated SOB and diaphoresis x1 day. Reportedly on admission pain was described as warmth & pressure with pain radiating down L arm to the neck. Symptoms noted to last 1 hour. [place] recently [place] from [place] a few weeks ago. Troponins negative. Pulmonary called to consult on SOB. CXR with clear lungs. O2 sats 96%.    
pt w/ chest pain  r/o acs  trops neg  echo  stress test  cards eval  pulm eval  pt takes pulm meds  dvt proph  
pt w/ chest pain  r/o acs  trops neg  echo/noted  cards to f/u    pulm f/u  id eval noted  covid  c/w meds  dvt proph  cleared for d/c as per id  cards f/u   d/c if ok w/ cards  
pt w/ chest pain  r/o acs  trops neg  echo/stress  stress test today  cards eval noted  pulm eval noted  c/w meds  dvt proph  
76 year old admitted with covid  not on oxygen  sats are normal no pna on chest xray   non toxic with chest pain and cough  vaccine history not available    no indication for steroids    we can send home  from [facility]   will dc remdesivr    defer need for AC therapy at home to pulmonary 
75 y/o with PMH of HTN, questionable lung disease, who presents with chest pain and associated SOB and diaphoresis x1 day. Reportedly on admission pain was described as warmth & pressure with pain radiating down L arm to the neck. Symptoms noted to last 1 hour. [place] from [place] few weeks ago. Troponins negative. Pulmonary called to consult on SOB. CXR with clear lungs. O2 sats 96%.

## 2021-08-03 NOTE — PROGRESS NOTE ADULT - PROVIDER SPECIALTY LIST ADULT
Internal Medicine
Cardiology
Pulmonology
Cardiology
Internal Medicine
Internal Medicine
Infectious Disease
Internal Medicine
Pulmonology
Pulmonology

## 2021-08-03 NOTE — CHART NOTE - NSCHARTNOTEFT_GEN_A_CORE
Request from Dr. Adams to facilitate patient discharge.  Medication reconciliation reviewed, revised, and resolved with Dr. Adams, who has medically cleared patient for discharge with follow up as advised.  Please refer to discharge note for detailed hospital course.

## 2021-08-03 NOTE — DISCHARGE NOTE PROVIDER - PROVIDER TOKENS
PROVIDER:[TOKEN:[[id number]]] PROVIDER:[TOKEN:[[id number]]],PROVIDER:[TOKEN:[[id number]]],PROVIDER:[TOKEN:[[id number]]]

## 2021-08-03 NOTE — PROGRESS NOTE ADULT - PROBLEM SELECTOR PLAN 1
Questionable underlying lung disease due to exposure to secondhand smoke (pt notes taking inhalers at home) vs cardiac in nature   -No wheezing on exam at this time, SOB appears to have resolved  -CXR with clear lungs  -Can continue with Albuterol nebs & Pulmicort 0.25 mg inhaled BID  -F/u NST  -F/u TTE  -Eventual outpt PFTs   -Normoxic
negative PCR on admission then with 2 rapid RVP's +COVID  -Remdesivir per ID (monitor creatinine, LFTs)  -Sats remain high 90s. No indication for decadron at this time.  -Keep sats >90% with supplemental O2 PRN  -ID f/u
negative PCR on admission then with 2 rapid RVP's +COVID  -Normoxic, no c/o dyspnea at this time   -Sats remain high 90s. No indication for decadron at this time.  -ID recs noted  -D/c planning per primary team. Ddimer normal, LE duplex negative DVT, no indication for ppx AC on discharge.

## 2021-08-03 NOTE — DISCHARGE NOTE PROVIDER - CARE PROVIDER_API CALL
AMRIK MARTINEZ  Internal Medicine  [address] [address]  Phone: ([phone]  Fax: ()-  Follow Up Time:    AMRIK MARTINEZ  Internal Medicine  [address] [address]  Phone: ()-  Fax: ()-  Follow Up Time:     Helio Jaquez)  Critical Care Medicine  [address]  [place] [address]  Phone: [phone]  Fax: [phone]  Follow Up Time:     Ahmet Kenny)  Cardiology; Internal Medicine  [address] [place] [address]  Phone: [phone]  Fax: [phone]  Follow Up Time:

## 2021-08-03 NOTE — PROGRESS NOTE ADULT - SUBJECTIVE AND OBJECTIVE BOX
SUBJ:    Home Medications:  acetaminophen 500 mg oral tablet: 1 to 2 tab(s) orally , As Needed (30 Jul 2021 17:44)  amlodipino 5mg tablet: 1 tab(s) orally once a day (in the evening) (30 Jul 2021 17:44)  beclometasona Dipropionata 50mcg inhaler: 2 puff(s) inhaled 2 times a day (30 Jul 2021 17:44)  Bromuro de Ipratropio 20mcg inhaler: 2 puff(s) inhaled 2 times a day (30 Jul 2021 17:44)  Diosmina/Hesperida 450mg/5mg tablet: 1 tab(s) orally 2 times a day (30 Jul 2021 17:44)  rosuvina 40mg tablet: 1 tab(s) orally once a day (30 Jul 2021 17:44)  Salbutamol 100mcg inhaler: 2 puff(s) inhaled 2 times a day (30 Jul 2021 17:44)      MEDICATIONS  (STANDING):  ALBUTerol   0.042% 1.25 milliGRAM(s) Nebulizer four times a day  amLODIPine   Tablet 5 milliGRAM(s) Oral daily  atorvastatin 40 milliGRAM(s) Oral at bedtime  buDESOnide    Inhalation Suspension 0.25 milliGRAM(s) Inhalation two times a day  enoxaparin Injectable 40 milliGRAM(s) SubCutaneous daily  pantoprazole    Tablet 40 milliGRAM(s) Oral before breakfast    MEDICATIONS  (PRN):  acetaminophen   Tablet .. 650 milliGRAM(s) Oral every 6 hours PRN Mild Pain (1 - 3)  aluminum hydroxide/magnesium hydroxide/simethicone Suspension 30 milliLiter(s) Oral every 4 hours PRN Dyspepsia  guaiFENesin Oral Liquid (Sugar-Free) 200 milliGRAM(s) Oral every 6 hours PRN Cough      Vital Signs Last 24 Hrs  T(C): 36.7 (03 Aug 2021 08:30), Max: 37.4 (02 Aug 2021 16:20)  T(F): 98 (03 Aug 2021 08:30), Max: 99.3 (02 Aug 2021 16:20)  HR: 73 (03 Aug 2021 08:30) (73 - 89)  BP: 134/82 (03 Aug 2021 08:30) (104/63 - 134/82)  BP(mean): --  RR: 17 (03 Aug 2021 08:30) (17 - 18)  SpO2: 96% (03 Aug 2021 08:30) (96% - 97%)    REVIEW OF SYSTEMS:  As per HPI, otherwise unremarkable.     PHYSICAL EXAM:  Constitutional/Appearance: Normal, Well-developed  HEENT:   Normal oral mucosa, no drainage or redness, supple neck  Lymphatic: No lymphadenopathy  Cardiovascular: Normal S1 S2, No edema, RRR  Respiratory: Lungs clear to auscultation, respirations non-labored  Psychiatry: A & O x 3, appropriate affect.   Gastrointestinal:  Soft, Non-tender, no distention  Skin: No rashes, No ecchymoses, No cyanosis	  Neurologic: Non-focal, Alert and oriented x 3  Extremities: Normal range of motion  Vascular: Peripheral pulses palpable 2+ bilaterally (radial)    TELEMETRY:    ECG:    TTE:    LABS:  CBC Full  -  ( 03 Aug 2021 08:23 )  WBC Count : 5.63 K/uL  RBC Count : 4.53 M/uL  Hemoglobin : 13.2 g/dL  Hematocrit : 40.8 %  Platelet Count - Automated : 197 K/uL  Mean Cell Volume : 90.1 fl  Mean Cell Hemoglobin : 29.1 pg  Mean Cell Hemoglobin Concentration : 32.4 gm/dL    08-03    x   |  x   |  x   ----------------------------<  x   x    |  x   |  0.73      TPro  7.5  /  Alb  3.9  /  TBili  0.3  /  DBili  <0.1  /  AST  50<H>  /  ALT  52<H>  /  AlkPhos  138<H>  08-03          RADIOLOGY & ADDITIONAL STUDIES:    IMPRESSION AND PLAN:        Mellisa Pak MD, MPH, AGUSTIN, RPVI, FACC  Inpatient Cardiovascular Specialist; [facility]  ; [employer] at \[facility] [place] [facility]""/[facility]  c: [phone] (text preferred and/or call)  e: [email or web address]    For all [facility] Cardiology and Cardiovascular Surgery on-service contact/call information, go to [email or web address] and use "[email or web address]" to login.  For outpatient Cardiology appointments, call  [phone] to arrange with a colleague; I do not have outpatient Cardiology clinic.    SUBJ:  Elevated temperature, chest congestion, cough. Found to be COVID positive and placed in isolation; started on remdesivir while hospitalized to be discontinued upon discharge. ECHO with preserved EF and no WMA. Undetectable cardiac enzymes.     Home Medications:  acetaminophen 500 mg oral tablet: 1 to 2 tab(s) orally , As Needed (30 Jul 2021 17:44)  amlodipino 5mg tablet: 1 tab(s) orally once a day (in the evening) (30 Jul 2021 17:44)  beclometasona Dipropionata 50mcg inhaler: 2 puff(s) inhaled 2 times a day (30 Jul 2021 17:44)  Bromuro de Ipratropio 20mcg inhaler: 2 puff(s) inhaled 2 times a day (30 Jul 2021 17:44)  Diosmina/Hesperida 450mg/5mg tablet: 1 tab(s) orally 2 times a day (30 Jul 2021 17:44)  rosuvina 40mg tablet: 1 tab(s) orally once a day (30 Jul 2021 17:44)  Salbutamol 100mcg inhaler: 2 puff(s) inhaled 2 times a day (30 Jul 2021 17:44)    MEDICATIONS  (STANDING):  ALBUTerol   0.042% 1.25 milliGRAM(s) Nebulizer four times a day  amLODIPine   Tablet 5 milliGRAM(s) Oral daily  atorvastatin 40 milliGRAM(s) Oral at bedtime  buDESOnide    Inhalation Suspension 0.25 milliGRAM(s) Inhalation two times a day  enoxaparin Injectable 40 milliGRAM(s) SubCutaneous daily  pantoprazole    Tablet 40 milliGRAM(s) Oral before breakfast    MEDICATIONS  (PRN):  acetaminophen   Tablet .. 650 milliGRAM(s) Oral every 6 hours PRN Mild Pain (1 - 3)  aluminum hydroxide/magnesium hydroxide/simethicone Suspension 30 milliLiter(s) Oral every 4 hours PRN Dyspepsia  guaiFENesin Oral Liquid (Sugar-Free) 200 milliGRAM(s) Oral every 6 hours PRN Cough    Vital Signs Last 24 Hrs  T(C): 36.7 (03 Aug 2021 08:30), Max: 37.4 (02 Aug 2021 16:20)  T(F): 98 (03 Aug 2021 08:30), Max: 99.3 (02 Aug 2021 16:20)  HR: 73 (03 Aug 2021 08:30) (73 - 89)  BP: 134/82 (03 Aug 2021 08:30) (104/63 - 134/82)  RR: 17 (03 Aug 2021 08:30) (17 - 18)  SpO2: 96% (03 Aug 2021 08:30) (96% - 97%)    REVIEW OF SYSTEMS:  As per HPI, otherwise unremarkable.     PHYSICAL EXAM:  Constitutional/Appearance: Normal, Well-developed  HEENT:   Normal oral mucosa, no drainage or redness, supple neck  Lymphatic: No lymphadenopathy  Cardiovascular: Normal S1 S2,  RRR  Respiratory: Lungs clear to auscultation  Psychiatry: A & O x 3  Gastrointestinal:  Soft, Non-tender  Skin: No rashes, No ecchymoses,  Neurologic: Non-focal  Extremities: Normal range of motion  Vascular: Peripheral pulses palpable 2+ bilaterally (radial)    TTE: 8/1/2021  Conclusions:  1. Mitral annular calcification, otherwise normal mitral  valve. Minimal mitral regurgitation.  2. Normal left ventricular internal dimensions and wall  thicknesses.  3. Endocardium not well visualized; grossly normal left  ventricular systolic function.  4. Mild diastolic dysfunction (Stage I).  5. Normal right ventricular size and function.  6. Estimated right ventricular systolic pressure equals 38  mm Hg, assuming right atrial pressure equals 8 mm Hg,  consistent with borderline pulmonary hypertension.    LABS:  CBC Full  -  ( 03 Aug 2021 08:23 )  WBC Count : 5.63 K/uL  RBC Count : 4.53 M/uL  Hemoglobin : 13.2 g/dL  Hematocrit : 40.8 %  Platelet Count - Automated : 197 K/uL  Mean Cell Volume : 90.1 fl  Mean Cell Hemoglobin : 29.1 pg  Mean Cell Hemoglobin Concentration : 32.4 gm/dL    08-03    x   |  x   |  x   ----------------------------<  x   x    |  x   |  0.73    TPro  7.5  /  Alb  3.9  /  TBili  0.3  /  DBili  <0.1  /  AST  50<H>  /  ALT  52<H>  /  AlkPhos  138<H>  08-03    IMPRESSION AND PLAN:  76 year old female with past medical history of HTN presenting with atypical chest pain found to have COVID-19/acute SARDS-CoV-2 infection.     1) COVID-19/acute SARDS-CoV-2 infection  Elevated inflammatory markers   Positive PCR   Consistent with symptoms     ·	Continue symptomatic relief and support.   ·	Recommendations as per ID.     2) Atypical chest discomfort   Secondary to acute COVID infection; noncardiac chest discomfort   ECHO with preserved EF and no WMA  Undetectable enzymes and nonischemic ECG     ·	No indication for stress testing.   ·	Follow up PCP for risk factor modification and post infection checkup.     3) HTN   Well controlled.     ·	Continue medical mangement with amlodipine 5 mg daily.     4) HLD   Elevated ASCVD    ·	Continue home regimen rosuvastatin 20-40 mg nightly.     Acceptable for discharge for a cardiac standpoint Signing off.     Mellisa Pak MD, MPH, AGUSTIN, RPVI, [facility]  Inpatient Cardiovascular Specialist; [facility]  ; [employer] at [facility]  c: [phone] (text preferred and/or call)  e: [email or web address]    For all [facility] Cardiology and Cardiovascular Surgery on-service contact/call information, go to [email or web address] and use "[email or web address]" to login.  For outpatient Cardiology appointments, call  [phone] to arrange with a colleague; I do not have outpatient Cardiology clinic.

## 2021-08-03 NOTE — PROGRESS NOTE ADULT - PROBLEM SELECTOR PLAN 2
Questionable underlying lung disease due to exposure to secondhand smoke (pt notes taking inhalers at home) vs cardiac in nature   -No wheezing on exam at this time, SOB appears to have resolved  -CXR with clear lungs  -Can continue with Albuterol nebs & Pulmicort 0.25 mg inhaled BID. D/c nebs on discharge and resume outpt inhalers. F/u with outpatient MD on discharge.   -Eventual outpt PFTs   -Normoxic
-Cards recs noted  -F/u TTE  -F/u NST
Questionable underlying lung disease due to exposure to secondhand smoke (pt notes taking inhalers at home) vs cardiac in nature   -No wheezing on exam at this time, SOB appears to have resolved  -CXR with clear lungs  -Can continue with Albuterol nebs & Pulmicort 0.25 mg inhaled BID  -F/u NST  -F/u TTE  -Eventual outpt PFTs   -Normoxic

## 2021-08-03 NOTE — DISCHARGE NOTE PROVIDER - CARE PROVIDERS DIRECT ADDRESSES
,DirectAddress_Unknown ,DirectAddress_Unknown,DirectAddress_Unknown,[email or web address]

## 2021-08-06 PROBLEM — Z00.00 ENCOUNTER FOR PREVENTIVE HEALTH EXAMINATION: Status: ACTIVE | Noted: 2021-08-06

## 2021-08-06 LAB
CULTURE RESULTS: SIGNIFICANT CHANGE UP
CULTURE RESULTS: SIGNIFICANT CHANGE UP
SPECIMEN SOURCE: SIGNIFICANT CHANGE UP
SPECIMEN SOURCE: SIGNIFICANT CHANGE UP

## 2025-03-19 NOTE — ED ADULT NURSE NOTE - CAS EDN DISCHARGE ASSESSMENT
Procedural Sedation Assessment  Consent  Risks, benefits, and alternatives have been discussed with the patient/patient representative, and patient/patient representative agrees to proceed: Yes  Medical History (complete if no H&P available, or if H&P is greater than 30 days old)  Significant medical/surgical history: No  Past Complications with Sedation/Anesthesia: No  Significant Family History: No  Smoking History: No  Alcohol/Drug abuse: No  Possible Pregnancy (LMP): Not Applicable  Cardiac History: No  Respiratory History: No  PHYSICAL EXAM (complete on day of procedure, regardless of whether valid H&P is present)  History and Physical Reviewed: H&P completed today  Airway Risk History: No previous complications  Airway Anatomy : Class I  Heart : Normal  Lungs : Normal  LOC/Mental Status : Normal  Other Findings  Reviewed current medications and allergies: Yes  Pertinent lab/diagnostic test reviewed: Yes  Sedation Risk Assessment  Risk Status ASA: Class II - Normal patient with mild systemic disease  Plan for Sedation: Moderate Sedation  Indications for Procedure/Pre-Procedure Diagnosis and Planned Procedure: displaced patellar fracture  EKG Monitoring: Yes    
Alert and oriented to person, place and time